# Patient Record
Sex: MALE | Race: BLACK OR AFRICAN AMERICAN | Employment: OTHER | URBAN - METROPOLITAN AREA
[De-identification: names, ages, dates, MRNs, and addresses within clinical notes are randomized per-mention and may not be internally consistent; named-entity substitution may affect disease eponyms.]

---

## 2018-05-24 ENCOUNTER — HOSPITAL ENCOUNTER (OUTPATIENT)
Dept: LAB | Age: 78
Discharge: HOME | End: 2018-05-24
Payer: MEDICARE

## 2018-05-24 DIAGNOSIS — R80.9 PROTEINURIA: ICD-10-CM

## 2018-05-24 DIAGNOSIS — E11.22 TYPE 2 DIABETES MELLITUS WITH ESRD (END-STAGE RENAL DISEASE) (HCC): ICD-10-CM

## 2018-05-24 DIAGNOSIS — N18.6 TYPE 2 DIABETES MELLITUS WITH ESRD (END-STAGE RENAL DISEASE) (HCC): ICD-10-CM

## 2018-05-24 LAB
25(OH)D3 SERPL-MCNC: 21.8 NG/ML (ref 30–100)
ALBUMIN SERPL-MCNC: 3.9 G/DL (ref 3.4–5)
ANION GAP SERPL CALC-SCNC: 8 MMOL/L (ref 3–18)
BASOPHILS # BLD: 0 K/UL (ref 0–0.06)
BASOPHILS NFR BLD: 0 % (ref 0–2)
BUN SERPL-MCNC: 8 MG/DL (ref 7–18)
BUN/CREAT SERPL: 11 (ref 12–20)
CALCIUM SERPL-MCNC: 10.1 MG/DL (ref 8.5–10.1)
CHLORIDE SERPL-SCNC: 89 MMOL/L (ref 100–108)
CO2 SERPL-SCNC: 32 MMOL/L (ref 21–32)
CREAT SERPL-MCNC: 0.71 MG/DL (ref 0.6–1.3)
CREAT UR-MCNC: 14.2 MG/DL (ref 30–125)
CREAT UR-MCNC: 15 MG/DL (ref 30–125)
DIFFERENTIAL METHOD BLD: ABNORMAL
EOSINOPHIL # BLD: 0.4 K/UL (ref 0–0.4)
EOSINOPHIL NFR BLD: 7 % (ref 0–5)
ERYTHROCYTE [DISTWIDTH] IN BLOOD BY AUTOMATED COUNT: 15 % (ref 11.6–14.5)
EST. AVERAGE GLUCOSE BLD GHB EST-MCNC: 134 MG/DL
GLUCOSE SERPL-MCNC: 82 MG/DL (ref 74–99)
HBA1C MFR BLD: 6.3 % (ref 4.2–5.6)
HCT VFR BLD AUTO: 40.1 % (ref 36–48)
HGB BLD-MCNC: 14.8 G/DL (ref 13–16)
LYMPHOCYTES # BLD: 1 K/UL (ref 0.9–3.6)
LYMPHOCYTES NFR BLD: 17 % (ref 21–52)
MCH RBC QN AUTO: 28.2 PG (ref 24–34)
MCHC RBC AUTO-ENTMCNC: 36.9 G/DL (ref 31–37)
MCV RBC AUTO: 76.4 FL (ref 74–97)
MICROALBUMIN UR-MCNC: 45.9 MG/DL (ref 0–3)
MICROALBUMIN/CREAT UR-RTO: 3232 MG/G (ref 0–30)
MONOCYTES # BLD: 0.8 K/UL (ref 0.05–1.2)
MONOCYTES NFR BLD: 15 % (ref 3–10)
NEUTS SEG # BLD: 3.5 K/UL (ref 1.8–8)
NEUTS SEG NFR BLD: 61 % (ref 40–73)
PHOSPHATE SERPL-MCNC: 3.7 MG/DL (ref 2.5–4.9)
PLATELET # BLD AUTO: 324 K/UL (ref 135–420)
PMV BLD AUTO: 9.6 FL (ref 9.2–11.8)
POTASSIUM SERPL-SCNC: 3.8 MMOL/L (ref 3.5–5.5)
PROT UR-MCNC: 50 MG/DL
RBC # BLD AUTO: 5.25 M/UL (ref 4.7–5.5)
SODIUM SERPL-SCNC: 129 MMOL/L (ref 136–145)
WBC # BLD AUTO: 5.7 K/UL (ref 4.6–13.2)

## 2018-05-24 PROCEDURE — 36415 COLL VENOUS BLD VENIPUNCTURE: CPT | Performed by: INTERNAL MEDICINE

## 2018-05-24 PROCEDURE — 82306 VITAMIN D 25 HYDROXY: CPT | Performed by: INTERNAL MEDICINE

## 2018-05-24 PROCEDURE — 85025 COMPLETE CBC W/AUTO DIFF WBC: CPT | Performed by: INTERNAL MEDICINE

## 2018-05-24 PROCEDURE — 84156 ASSAY OF PROTEIN URINE: CPT | Performed by: INTERNAL MEDICINE

## 2018-05-24 PROCEDURE — 83036 HEMOGLOBIN GLYCOSYLATED A1C: CPT | Performed by: INTERNAL MEDICINE

## 2018-05-24 PROCEDURE — 80069 RENAL FUNCTION PANEL: CPT | Performed by: INTERNAL MEDICINE

## 2018-05-24 PROCEDURE — 82570 ASSAY OF URINE CREATININE: CPT | Performed by: INTERNAL MEDICINE

## 2018-05-24 PROCEDURE — 83883 ASSAY NEPHELOMETRY NOT SPEC: CPT | Performed by: INTERNAL MEDICINE

## 2018-05-24 PROCEDURE — 82784 ASSAY IGA/IGD/IGG/IGM EACH: CPT | Performed by: INTERNAL MEDICINE

## 2018-05-25 LAB
ALBUMIN 24H MFR UR ELPH: 78.3 %
ALPHA1 GLOB 24H MFR UR ELPH: 5.8 %
ALPHA2 GLOB 24H MFR UR ELPH: 3.7 %
B-GLOBULIN MFR UR ELPH: 7.7 %
GAMMA GLOB 24H MFR UR ELPH: 4.5 %
INTERPRETATION UR IFE-IMP: NORMAL
KAPPA LC FREE SER-MCNC: 13.4 MG/L (ref 3.3–19.4)
KAPPA LC FREE/LAMBDA FREE SER: 1.16 {RATIO} (ref 0.26–1.65)
LAMBDA LC FREE SERPL-MCNC: 11.6 MG/L (ref 5.7–26.3)
M PROTEIN 24H MFR UR ELPH: NORMAL %
NOTE, 149533: NORMAL
PROT UR-MCNC: 39.6 MG/DL

## 2018-05-28 LAB
IGA SERPL-MCNC: 108 MG/DL (ref 61–437)
IGG SERPL-MCNC: 673 MG/DL (ref 700–1600)
IGM SERPL-MCNC: 32 MG/DL (ref 15–143)
PROT PATTERN SERPL IFE-IMP: ABNORMAL

## 2018-07-24 ENCOUNTER — HOSPITAL ENCOUNTER (OUTPATIENT)
Dept: LAB | Age: 78
Discharge: HOME | End: 2018-07-24
Payer: MEDICARE

## 2018-07-24 DIAGNOSIS — E11.22 TYPE 2 DIABETES MELLITUS WITH ESRD (END-STAGE RENAL DISEASE) (HCC): ICD-10-CM

## 2018-07-24 DIAGNOSIS — N18.6 TYPE 2 DIABETES MELLITUS WITH ESRD (END-STAGE RENAL DISEASE) (HCC): ICD-10-CM

## 2018-07-24 DIAGNOSIS — R80.9 PROTEINURIA: ICD-10-CM

## 2018-07-24 LAB
25(OH)D3 SERPL-MCNC: 20.4 NG/ML (ref 30–100)
ALBUMIN SERPL-MCNC: 4 G/DL (ref 3.4–5)
ANION GAP SERPL CALC-SCNC: 8 MMOL/L (ref 3–18)
APPEARANCE UR: CLEAR
BILIRUB UR QL: NEGATIVE
BUN SERPL-MCNC: 9 MG/DL (ref 7–18)
BUN/CREAT SERPL: 11 (ref 12–20)
CALCIUM SERPL-MCNC: 10.3 MG/DL (ref 8.5–10.1)
CALCIUM SERPL-MCNC: 10.6 MG/DL (ref 8.5–10.1)
CHLORIDE SERPL-SCNC: 90 MMOL/L (ref 100–108)
CO2 SERPL-SCNC: 31 MMOL/L (ref 21–32)
COLOR UR: YELLOW
CREAT SERPL-MCNC: 0.81 MG/DL (ref 0.6–1.3)
CREAT UR-MCNC: <13 MG/DL (ref 30–125)
GLUCOSE SERPL-MCNC: 85 MG/DL (ref 74–99)
GLUCOSE UR STRIP.AUTO-MCNC: NEGATIVE MG/DL
HGB UR QL STRIP: NEGATIVE
KETONES UR QL STRIP.AUTO: NEGATIVE MG/DL
LEUKOCYTE ESTERASE UR QL STRIP.AUTO: NEGATIVE
MICROALBUMIN UR-MCNC: 19.7 MG/DL (ref 0–3)
MICROALBUMIN/CREAT UR-RTO: ABNORMAL MG/G (ref 0–30)
NITRITE UR QL STRIP.AUTO: NEGATIVE
PH UR STRIP: 6.5 [PH] (ref 5–8)
PHOSPHATE SERPL-MCNC: 3.7 MG/DL (ref 2.5–4.9)
POTASSIUM SERPL-SCNC: 4.1 MMOL/L (ref 3.5–5.5)
PROT UR STRIP-MCNC: ABNORMAL MG/DL
PROT UR-MCNC: 22 MG/DL
PTH-INTACT SERPL-MCNC: 74.2 PG/ML (ref 18.4–88)
SODIUM SERPL-SCNC: 129 MMOL/L (ref 136–145)
SP GR UR REFRACTOMETRY: 1.01 (ref 1–1.03)
UROBILINOGEN UR QL STRIP.AUTO: 0.2 EU/DL (ref 0.2–1)

## 2018-07-24 PROCEDURE — 82306 VITAMIN D 25 HYDROXY: CPT | Performed by: INTERNAL MEDICINE

## 2018-07-24 PROCEDURE — 83970 ASSAY OF PARATHORMONE: CPT | Performed by: INTERNAL MEDICINE

## 2018-07-24 PROCEDURE — 80069 RENAL FUNCTION PANEL: CPT | Performed by: INTERNAL MEDICINE

## 2018-07-24 PROCEDURE — 82570 ASSAY OF URINE CREATININE: CPT | Performed by: INTERNAL MEDICINE

## 2018-07-24 PROCEDURE — 81001 URINALYSIS AUTO W/SCOPE: CPT | Performed by: INTERNAL MEDICINE

## 2018-07-24 PROCEDURE — 84156 ASSAY OF PROTEIN URINE: CPT | Performed by: INTERNAL MEDICINE

## 2018-07-24 PROCEDURE — 36415 COLL VENOUS BLD VENIPUNCTURE: CPT | Performed by: INTERNAL MEDICINE

## 2018-12-26 ENCOUNTER — APPOINTMENT (OUTPATIENT)
Dept: GENERAL RADIOLOGY | Age: 78
End: 2018-12-26
Attending: PHYSICIAN ASSISTANT
Payer: MEDICARE

## 2018-12-26 ENCOUNTER — HOSPITAL ENCOUNTER (EMERGENCY)
Age: 78
Discharge: HOME | End: 2018-12-26
Attending: EMERGENCY MEDICINE
Payer: MEDICARE

## 2018-12-26 VITALS
DIASTOLIC BLOOD PRESSURE: 75 MMHG | TEMPERATURE: 99.7 F | BODY MASS INDEX: 29.33 KG/M2 | HEART RATE: 94 BPM | OXYGEN SATURATION: 100 % | WEIGHT: 198 LBS | SYSTOLIC BLOOD PRESSURE: 159 MMHG | RESPIRATION RATE: 18 BRPM | HEIGHT: 69 IN

## 2018-12-26 DIAGNOSIS — E87.1 HYPONATREMIA: ICD-10-CM

## 2018-12-26 DIAGNOSIS — E87.6 HYPOKALEMIA: ICD-10-CM

## 2018-12-26 DIAGNOSIS — J44.1 ACUTE EXACERBATION OF CHRONIC OBSTRUCTIVE PULMONARY DISEASE (COPD) (HCC): Primary | ICD-10-CM

## 2018-12-26 LAB
ANION GAP SERPL CALC-SCNC: 11 MMOL/L (ref 3–18)
ATRIAL RATE: 89 BPM
BASOPHILS # BLD: 0 K/UL (ref 0–0.1)
BASOPHILS NFR BLD: 0 % (ref 0–2)
BUN SERPL-MCNC: 9 MG/DL (ref 7–18)
BUN/CREAT SERPL: 9 (ref 12–20)
CALCIUM SERPL-MCNC: 11 MG/DL (ref 8.5–10.1)
CALCULATED P AXIS, ECG09: 46 DEGREES
CALCULATED R AXIS, ECG10: 14 DEGREES
CALCULATED T AXIS, ECG11: 45 DEGREES
CHLORIDE SERPL-SCNC: 89 MMOL/L (ref 100–108)
CO2 SERPL-SCNC: 29 MMOL/L (ref 21–32)
CREAT SERPL-MCNC: 1 MG/DL (ref 0.6–1.3)
DIAGNOSIS, 93000: NORMAL
DIFFERENTIAL METHOD BLD: ABNORMAL
EOSINOPHIL # BLD: 1 K/UL (ref 0–0.4)
EOSINOPHIL NFR BLD: 11 % (ref 0–5)
ERYTHROCYTE [DISTWIDTH] IN BLOOD BY AUTOMATED COUNT: 14.8 % (ref 11.6–14.5)
GLUCOSE SERPL-MCNC: 101 MG/DL (ref 74–99)
HCT VFR BLD AUTO: 40.3 % (ref 36–48)
HGB BLD-MCNC: 14.8 G/DL (ref 13–16)
LYMPHOCYTES # BLD: 1 K/UL (ref 0.9–3.6)
LYMPHOCYTES NFR BLD: 11 % (ref 21–52)
MCH RBC QN AUTO: 27.8 PG (ref 24–34)
MCHC RBC AUTO-ENTMCNC: 36.7 G/DL (ref 31–37)
MCV RBC AUTO: 75.8 FL (ref 74–97)
MONOCYTES # BLD: 0.9 K/UL (ref 0.05–1.2)
MONOCYTES NFR BLD: 10 % (ref 3–10)
NEUTS SEG # BLD: 6.3 K/UL (ref 1.8–8)
NEUTS SEG NFR BLD: 68 % (ref 40–73)
P-R INTERVAL, ECG05: 194 MS
PLATELET # BLD AUTO: 325 K/UL (ref 135–420)
PLATELET COMMENTS,PCOM: ABNORMAL
PMV BLD AUTO: 9.3 FL (ref 9.2–11.8)
POTASSIUM SERPL-SCNC: 3.3 MMOL/L (ref 3.5–5.5)
Q-T INTERVAL, ECG07: 366 MS
QRS DURATION, ECG06: 124 MS
QTC CALCULATION (BEZET), ECG08: 445 MS
RBC # BLD AUTO: 5.32 M/UL (ref 4.7–5.5)
RBC MORPH BLD: ABNORMAL
RBC MORPH BLD: ABNORMAL
SODIUM SERPL-SCNC: 129 MMOL/L (ref 136–145)
TROPONIN I SERPL-MCNC: <0.02 NG/ML (ref 0–0.06)
VENTRICULAR RATE, ECG03: 89 BPM
WBC # BLD AUTO: 9.2 K/UL (ref 4.6–13.2)

## 2018-12-26 PROCEDURE — 85025 COMPLETE CBC W/AUTO DIFF WBC: CPT

## 2018-12-26 PROCEDURE — 77030029684 HC NEB SM VOL KT MONA -A

## 2018-12-26 PROCEDURE — 93005 ELECTROCARDIOGRAM TRACING: CPT

## 2018-12-26 PROCEDURE — 74011000250 HC RX REV CODE- 250: Performed by: PHYSICIAN ASSISTANT

## 2018-12-26 PROCEDURE — 96374 THER/PROPH/DIAG INJ IV PUSH: CPT

## 2018-12-26 PROCEDURE — 80048 BASIC METABOLIC PNL TOTAL CA: CPT

## 2018-12-26 PROCEDURE — 84484 ASSAY OF TROPONIN QUANT: CPT

## 2018-12-26 PROCEDURE — 74011250636 HC RX REV CODE- 250/636: Performed by: PHYSICIAN ASSISTANT

## 2018-12-26 PROCEDURE — 71046 X-RAY EXAM CHEST 2 VIEWS: CPT

## 2018-12-26 PROCEDURE — 94640 AIRWAY INHALATION TREATMENT: CPT

## 2018-12-26 PROCEDURE — 74011000250 HC RX REV CODE- 250: Performed by: EMERGENCY MEDICINE

## 2018-12-26 PROCEDURE — 99283 EMERGENCY DEPT VISIT LOW MDM: CPT

## 2018-12-26 RX ORDER — IPRATROPIUM BROMIDE AND ALBUTEROL SULFATE 2.5; .5 MG/3ML; MG/3ML
3 SOLUTION RESPIRATORY (INHALATION)
Status: COMPLETED | OUTPATIENT
Start: 2018-12-26 | End: 2018-12-26

## 2018-12-26 RX ORDER — BENZONATATE 100 MG/1
100 CAPSULE ORAL
Qty: 21 CAP | Refills: 0 | Status: SHIPPED | OUTPATIENT
Start: 2018-12-26 | End: 2019-01-02

## 2018-12-26 RX ORDER — PREDNISONE 50 MG/1
50 TABLET ORAL DAILY
Qty: 5 TAB | Refills: 0 | Status: SHIPPED | OUTPATIENT
Start: 2018-12-26 | End: 2018-12-31

## 2018-12-26 RX ORDER — POTASSIUM CHLORIDE 1500 MG/1
20 TABLET, FILM COATED, EXTENDED RELEASE ORAL DAILY
Qty: 20 TAB | Refills: 0 | Status: SHIPPED | OUTPATIENT
Start: 2018-12-26

## 2018-12-26 RX ORDER — LEVOFLOXACIN 750 MG/1
750 TABLET ORAL DAILY
Qty: 5 TAB | Refills: 0 | Status: SHIPPED | OUTPATIENT
Start: 2018-12-26 | End: 2018-12-31

## 2018-12-26 RX ADMIN — IPRATROPIUM BROMIDE AND ALBUTEROL SULFATE 3 ML: .5; 3 SOLUTION RESPIRATORY (INHALATION) at 16:21

## 2018-12-26 RX ADMIN — IPRATROPIUM BROMIDE AND ALBUTEROL SULFATE 3 ML: .5; 3 SOLUTION RESPIRATORY (INHALATION) at 16:01

## 2018-12-26 RX ADMIN — METHYLPREDNISOLONE SODIUM SUCCINATE 125 MG: 125 INJECTION, POWDER, FOR SOLUTION INTRAMUSCULAR; INTRAVENOUS at 17:39

## 2018-12-26 NOTE — ED TRIAGE NOTES
Patient c/o SOB that started last night. He has history of COPD. Patient denies chest pain.   Patient states he has been sick with a cold and has had non productive cough

## 2018-12-26 NOTE — DISCHARGE INSTRUCTIONS
Hyponatremia: Care Instructions  Your Care Instructions    Hyponatremia (say \"qm-ez-hdc-TREE-pushpa-uh\") means that you don't have enough sodium in your blood. It can cause nausea, vomiting, and headaches. Or you may not feel hungry. In serious cases, it can cause seizures, a coma, or even death. Hyponatremia is not a disease. It is a problem caused by something else, such as medicines or exercising for a long time in hot weather. You can get hyponatremia if you lose a lot of fluids and then you drink a lot of water or other liquids that don't have much sodium. You can also get it if you have kidney, liver, heart, or other health problems. Treatment is focused on getting your sodium levels back to normal.  Follow-up care is a key part of your treatment and safety. Be sure to make and go to all appointments, and call your doctor if you are having problems. It's also a good idea to know your test results and keep a list of the medicines you take. How can you care for yourself at home? · If your doctor recommends it, drink fluids that have sodium. Sports drinks are a good choice. Or you can eat salty foods. · If your doctor recommends it, limit the amount of water you drink. And limit fluids that are mostly water. These include tea, coffee, and juice. · Take your medicines exactly as prescribed. Call your doctor if you have any problems with your medicine. · Get your sodium levels tested when your doctor tells you to. When should you call for help? Call 911 anytime you think you may need emergency care.  For example, call if:    · You have a seizure.     · You passed out (lost consciousness).    Call your doctor now or seek immediate medical care if:    · You are confused or it is hard to focus.     · You have little or no appetite.     · You feel sick to your stomach or you vomit.     · You have a headache.     · You have mood changes.     · You feel more tired than usual.    Watch closely for changes in your health, and be sure to contact your doctor if:    · You do not get better as expected. Where can you learn more? Go to http://lowell-simin.info/. Enter U127 in the search box to learn more about \"Hyponatremia: Care Instructions. \"  Current as of: June 26, 2018  Content Version: 11.8  © 5880-8500 [employer]. Care instructions adapted under license by [employer] (which disclaims liability or warranty for this information). If you have questions about a medical condition or this instruction, always ask your healthcare professional. [address] any warranty or liability for your use of this information. Hypokalemia: Care Instructions  Your Care Instructions    Hypokalemia (say \"rv-ch-qje-ELIEZER-pushpa-uh\") is a low level of potassium. The heart, muscles, kidneys, and nervous system all need potassium to work well. This problem has many different causes. Kidney problems, diet, and medicines like diuretics and laxatives can cause it. So can vomiting or diarrhea. In some cases, cancer is the cause. Your doctor may do tests to find the cause of your low potassium levels. You may need medicines to bring your potassium levels back to normal. You may also need regular blood tests to check your potassium. If you have very low potassium, you may need intravenous (IV) medicines. You also may need tests to check the electrical activity of your heart. Heart problems caused by low potassium levels can be very serious. Follow-up care is a key part of your treatment and safety. Be sure to make and go to all appointments, and call your doctor if you are having problems. It's also a good idea to know your test results and keep a list of the medicines you take. How can you care for yourself at home? · If your doctor recommends it, eat foods that have a lot of potassium. These include fresh fruits, juices, and vegetables.  They also include nuts, beans, and milk. · Be safe with medicines. If your doctor prescribes medicines or potassium supplements, take them exactly as directed. Call your doctor if you have any problems with your medicines. · Get your potassium levels tested as often as your doctor tells you. When should you call for help? Call 911 anytime you think you may need emergency care. For example, call if:    · You feel like your heart is missing beats. Heart problems caused by low potassium can cause death.     · You passed out (lost consciousness).     · You have a seizure.    Call your doctor now or seek immediate medical care if:    · You feel weak or unusually tired.     · You have severe arm or leg cramps.     · You have tingling or numbness.     · You feel sick to your stomach, or you vomit.     · You have belly cramps.     · You feel bloated or constipated.     · You have to urinate a lot.     · You feel very thirsty most of the time.     · You are dizzy or lightheaded, or you feel like you may faint.     · You feel depressed, or you lose touch with reality.    Watch closely for changes in your health, and be sure to contact your doctor if:    · You do not get better as expected. Where can you learn more? Go to http://lowell-simin.info/. Enter G358 in the search box to learn more about \"Hypokalemia: Care Instructions. \"  Current as of: March 15, 2018  Content Version: 11.8  © 1255-8357 [employer]. Care instructions adapted under license by [employer] (which disclaims liability or warranty for this information). If you have questions about a medical condition or this instruction, always ask your healthcare professional. [place] [address] any warranty or liability for your use of this information.          Chronic Obstructive Pulmonary Disease (COPD): Care Instructions  Your Care Instructions    Chronic obstructive pulmonary disease (COPD) is a general term for a group of lung diseases, including emphysema and chronic bronchitis. People with COPD have decreased airflow in and out of the lungs, which makes it hard to breathe. The airways also can get clogged with thick mucus. Cigarette smoking is a major cause of COPD. Although there is no cure for COPD, you can slow its progress. Following your treatment plan and taking care of yourself can help you feel better and live longer. Follow-up care is a key part of your treatment and safety. Be sure to make and go to all appointments, and call your doctor if you are having problems. It's also a good idea to know your test results and keep a list of the medicines you take. How can you care for yourself at home?   Staying healthy    · Do not smoke. This is the most important step you can take to prevent more damage to your lungs. If you need help quitting, talk to your doctor about stop-smoking programs and medicines. These can increase your chances of quitting for good.     · Avoid colds and flu. Get a pneumococcal vaccine shot. If you have had one before, ask your doctor whether you need a second dose. Get the flu vaccine every fall. If you must be around people with colds or the flu, wash your hands often.     · Avoid secondhand smoke, air pollution, and high altitudes. Also avoid cold, dry air and hot, humid air. Stay at home with your windows closed when air pollution is bad.    Medicines and oxygen therapy    · Take your medicines exactly as prescribed. Call your doctor if you think you are having a problem with your medicine.     · You may be taking medicines such as:  ? Bronchodilators. These help open your airways and make breathing easier. Bronchodilators are either short-acting (work for 6 to 9 hours) or long-acting (work for 24 hours). You inhale most bronchodilators, so they start to act quickly. Always carry your quick-relief inhaler with you in case you need it while you are away from home. ?  Corticosteroids (prednisone, budesonide). These reduce airway inflammation. They come in pill or inhaled form. You must take these medicines every day for them to work well.     · A spacer may help you get more inhaled medicine to your lungs. Ask your doctor or pharmacist if a spacer is right for you. If it is, ask how to use it properly.     · Do not take any vitamins, over-the-counter medicine, or herbal products without talking to your doctor first.     · If your doctor prescribed antibiotics, take them as directed. Do not stop taking them just because you feel better. You need to take the full course of antibiotics.     · Oxygen therapy boosts the amount of oxygen in your blood and helps you breathe easier. Use the flow rate your doctor has recommended, and do not change it without talking to your doctor first.   Activity    · Get regular exercise. Walking is an easy way to get exercise. Start out slowly, and walk a little more each day.     · Pay attention to your breathing. You are exercising too hard if you cannot talk while you are exercising.     · Take short rest breaks when doing household chores and other activities.     · Learn breathing methods--such as breathing through pursed lips--to help you become less short of breath.     · If your doctor has not set you up with a pulmonary rehabilitation program, talk to him or her about whether rehab is right for you. Rehab includes exercise programs, education about your disease and how to manage it, help with diet and other changes, and emotional support. Diet    · Eat regular, healthy meals. Use bronchodilators about 1 hour before you eat to make it easier to eat. Eat several small meals instead of three large ones.  Drink beverages at the end of the meal. Avoid foods that are hard to chew.     · Eat foods that contain protein so that you do not lose muscle mass.     · Talk with your doctor if you gain too much weight or if you lose weight without trying.    Mental health    · Talk to your family, friends, or a therapist about your feelings. It is normal to feel frightened, angry, hopeless, helpless, and even guilty. Talking openly about bad feelings can help you cope. If these feelings last, talk to your doctor. When should you call for help? Call 911 anytime you think you may need emergency care. For example, call if:    · You have severe trouble breathing.    Call your doctor now or seek immediate medical care if:    · You have new or worse trouble breathing.     · You cough up blood.     · You have a fever.    Watch closely for changes in your health, and be sure to contact your doctor if:    · You cough more deeply or more often, especially if you notice more mucus or a change in the color of your mucus.     · You have new or worse swelling in your legs or belly.     · You are not getting better as expected. Where can you learn more? Go to http://lowell-simin.info/. Christine Grider in the search box to learn more about \"Chronic Obstructive Pulmonary Disease (COPD): Care Instructions. \"  Current as of: December 6, 2017  Content Version: 11.8  © 2371-7582 [employer]. Care instructions adapted under license by [employer] (which disclaims liability or warranty for this information). If you have questions about a medical condition or this instruction, always ask your healthcare professional. [address] any warranty or liability for your use of this information.

## 2018-12-26 NOTE — ED NOTES
Alicia Rothman is a 69 y.o. male that was discharged in stable condition. The patients diagnosis, condition and treatment were explained to  patient and aftercare instructions were given. The patient verbalized understanding. Patient armband removed and shredded.

## 2018-12-26 NOTE — ED PROVIDER NOTES
The history is provided by the patient. Shortness of Breath   This is a recurrent problem. The problem occurs continuously. The current episode started yesterday. The problem has been gradually worsening. Associated symptoms include cough (x 3 weeks), sputum production, hemoptysis and wheezing. Pertinent negatives include no fever, no headaches, no coryza, no rhinorrhea, no sore throat, no swollen glands, no ear pain, no neck pain, no chest pain, no syncope, no vomiting, no abdominal pain, no rash, no leg pain and no leg swelling. It is unknown what precipitated the problem. He has tried beta-agonist inhalers for the symptoms. The treatment provided no relief. He has had prior ED visits. Associated medical issues include COPD. Past Medical History:   Diagnosis Date    Anxiety     Asthma     no problems    Diabetes ([facility] [address]     diet controlled/exercise    Hypercholesteremia     Hypertension        Past Surgical History:   Procedure Laterality Date    HX CATARACT REMOVAL      bilateral    HX COLONOSCOPY           History reviewed. No pertinent family history.     Social History     Socioeconomic History    Marital status:      Spouse name: Not on file    Number of children: Not on file    Years of education: Not on file    Highest education level: Not on file   Social Needs    Financial resource strain: Not on file    Food insecurity - worry: Not on file    Food insecurity - inability: Not on file    Transportation needs - medical: Not on file   Mobile Captain needs - non-medical: Not on file   Occupational History    Not on file   Tobacco Use    Smoking status: Former Smoker    Smokeless tobacco: Never Used    Tobacco comment: quit smoking in 2002   Substance and Sexual Activity    Alcohol use: Yes     Comment: 2 beers per day    Drug use: No    Sexual activity: Not on file   Other Topics Concern    Not on file   Social History Narrative    Not on file         ALLERGIES: Pcn [penicillins] and Vioxx [rofecoxib]    Review of Systems   Constitutional: Negative for chills and fever. HENT: Negative for ear pain, rhinorrhea and sore throat. Eyes: Negative for pain and redness. Respiratory: Positive for cough (x 3 weeks), hemoptysis, sputum production, shortness of breath and wheezing. Negative for stridor. Cardiovascular: Negative for chest pain, palpitations, leg swelling and syncope. Gastrointestinal: Negative for abdominal pain, constipation, diarrhea, nausea and vomiting. Genitourinary: Negative for dysuria. Musculoskeletal: Negative for back pain and neck pain. Skin: Negative. Negative for rash. Neurological: Negative for dizziness, light-headedness and headaches. Psychiatric/Behavioral: Negative. All other systems reviewed and are negative. Vitals:    12/26/18 1600   BP: 159/75   Pulse: 94   Resp: 18   Temp: 99.7 °F (37.6 °C)   SpO2: 100%   Weight: 89.8 kg (198 lb)   Height: 5' 9\" (1.753 m)            Physical Exam   Constitutional: He is oriented to person, place, and time. He appears well-developed and well-nourished. Non-toxic appearance. He does not appear ill. No distress. HENT:   Head: Normocephalic and atraumatic. Right Ear: Tympanic membrane, external ear and ear canal normal.   Left Ear: Tympanic membrane, external ear and ear canal normal.   Nose: Nose normal.   Mouth/Throat: Oropharynx is clear and moist and mucous membranes are normal. No oropharyngeal exudate or posterior oropharyngeal edema. Eyes: Conjunctivae and EOM are normal. Pupils are equal, round, and reactive to light. Neck: Normal range of motion. Neck supple. Cardiovascular: Normal rate, regular rhythm and normal heart sounds. Pulmonary/Chest: Effort normal. No tachypnea. No respiratory distress. He has no decreased breath sounds. He has wheezes. He has no rhonchi. He has no rales. Abdominal: Soft. Bowel sounds are normal. There is no tenderness.    Musculoskeletal: Normal range of motion. Right lower leg: He exhibits no tenderness and no edema. Left lower leg: He exhibits no tenderness and no edema. Lymphadenopathy:     He has no cervical adenopathy. Neurological: He is alert and oriented to person, place, and time. Skin: Skin is warm and dry. Capillary refill takes less than 2 seconds. He is not diaphoretic. Psychiatric: He has a normal mood and affect. His behavior is normal. Judgment and thought content normal.   Nursing note and vitals reviewed. MDM  Number of Diagnoses or Management Options  Acute exacerbation of chronic obstructive pulmonary disease (COPD) ([facility] [address]): new and requires workup  Hypokalemia: new and requires workup  Hyponatremia: new and requires workup  Diagnosis management comments: DDx:  viral vs bacterial URI, asthma exacerbation, COPD, bronchitis, PNE, PE, allergies, pneumothorax, atypical CP, costochondritis/chest wall pain, HF, MI,  pleuritic chest pain, pleural effusion    Based upon the patient's presentation with noted HPI and PE, along with the work up done in the emergency department, I believe that the patient is having an acute COPD exacerbation. Also some mild asymtpomatic hyponatremia and hypokalemia, will have pt f/u with PCP for these. The patient's respiratory status improved in the emergency department with noted HHN treatments, steroids, and other noted modalities and medications. I believe that the patient has improved sufficiently enough for discharge home. DIAGNOSIS:    1. Acute COPD exacerbation. Plan/discharge instructions:  1. Return if any concerns or worsening of condition(s)  2. Levaquin and steroids as prescribed until finished. 3. Use the albuterol inhaler as prescribed for wheezing and/or cough. Tessalon as prescribed as needed for cough. 4. FOLLOW UP APPOINTMENT:  Your primary doctor in 1-2 days. Pt results have been reviewed with the patient and any family present.  They have been counseled regarding diagnosis, treatment, and plan. They verbally convey understanding and agreement of the signs, symptoms, diagnosis, treatment and prognosis and additionally agrees to follow up as discussed. They also agree with the care-plan and convey that all of their questions have been answered. I have also provided discharge instructions for them that include: educational information regarding their diagnosis and treatment, and list of reasons why they would want to return to the ED prior to their follow-up appointment, should their condition change. July Harrison PA-C         Amount and/or Complexity of Data Reviewed  Clinical lab tests: ordered and reviewed  Tests in the radiology section of CPT®: ordered and reviewed  Review and summarize past medical records: yes  Discuss the patient with other providers: yes  Independent visualization of images, tracings, or specimens: yes    Risk of Complications, Morbidity, and/or Mortality  Presenting problems: moderate  Diagnostic procedures: moderate  Management options: moderate    Patient Progress  Patient progress: stable         Procedures    Labs Reviewed   CBC WITH AUTOMATED DIFF - Abnormal; Notable for the following components:       Result Value    RDW 14.8 (*)     All other components within normal limits   METABOLIC PANEL, BASIC - Abnormal; Notable for the following components:    Sodium 129 (*)     Potassium 3.3 (*)     Chloride 89 (*)     Glucose 101 (*)     BUN/Creatinine ratio 9 (*)     Calcium 11.0 (*)     All other components within normal limits   TROPONIN I     Diagnosis:   1. Acute exacerbation of chronic obstructive pulmonary disease (COPD) ([facility] [address]    2. Hypokalemia    3. Hyponatremia          Disposition: Discharge to home.      Follow-up Information     Follow up With Specialties Details [facility] [place] [facility] EMERGENCY DEPT Emergency Medicine  As needed, If symptoms worsen [address] [facility]  [phone] [address] [facility]  Go in 2 days  [facility] [address] [place] [address] [address] [facility]             Medication List      START taking these medications    benzonatate 100 mg capsule  Commonly known as:  TESSALON PERLES  Take 1 Cap by mouth three (3) times daily as needed for Cough for up to 7 days. levoFLOXacin 750 mg tablet  Commonly known as:  LEVAQUIN  Take 1 Tab by mouth daily for 5 days. potassium chloride SR 20 mEq tablet  Commonly known as:  K-TAB  Take 1 Tab by mouth daily. predniSONE 50 mg tablet  Commonly known as:  DELTASONE  Take 1 Tab by mouth daily for 5 days. ASK your doctor about these medications    BROMDAY 0.09 % ophthalmic solution  Generic drug:  bromfenac     pravastatin 10 mg tablet  Commonly known as:  PRAVACHOL     raNITIdine hcl 300 mg Cap  Take 300 mg by mouth nightly.      TRAVATAN Z 0.004 % ophthalmic solution  Generic drug:  travoprost     trihexyphenidyl 5 mg tablet  Commonly known as:  ARTANE     valsartan 160 mg tablet  Commonly known as:  DIOVAN           Where to Get Your Medications      Information about where to get these medications is not yet available    Ask your nurse or doctor about these medications  · benzonatate 100 mg capsule  · levoFLOXacin 750 mg tablet  · potassium chloride SR 20 mEq tablet  · predniSONE 50 mg tablet

## 2019-01-16 ENCOUNTER — APPOINTMENT (OUTPATIENT)
Dept: GENERAL RADIOLOGY | Age: 79
End: 2019-01-16
Attending: EMERGENCY MEDICINE
Payer: MEDICARE

## 2019-01-16 ENCOUNTER — HOSPITAL ENCOUNTER (EMERGENCY)
Age: 79
Discharge: HOME | End: 2019-01-16
Attending: EMERGENCY MEDICINE
Payer: MEDICARE

## 2019-01-16 VITALS
WEIGHT: 194 LBS | OXYGEN SATURATION: 93 % | RESPIRATION RATE: 22 BRPM | BODY MASS INDEX: 28.73 KG/M2 | TEMPERATURE: 98.8 F | HEIGHT: 69 IN | SYSTOLIC BLOOD PRESSURE: 133 MMHG | HEART RATE: 90 BPM | DIASTOLIC BLOOD PRESSURE: 75 MMHG

## 2019-01-16 DIAGNOSIS — J44.1 COPD WITH ACUTE EXACERBATION (HCC): Primary | ICD-10-CM

## 2019-01-16 LAB
ALBUMIN SERPL-MCNC: 4.1 G/DL (ref 3.4–5)
ALBUMIN/GLOB SERPL: 1.1 {RATIO} (ref 0.8–1.7)
ALP SERPL-CCNC: 79 U/L (ref 45–117)
ALT SERPL-CCNC: 23 U/L (ref 16–61)
ANION GAP SERPL CALC-SCNC: 8 MMOL/L (ref 3–18)
AST SERPL-CCNC: 24 U/L (ref 15–37)
BASOPHILS # BLD: 0 K/UL (ref 0–0.06)
BASOPHILS NFR BLD: 0 % (ref 0–3)
BILIRUB SERPL-MCNC: 0.6 MG/DL (ref 0.2–1)
BNP SERPL-MCNC: 91 PG/ML (ref 0–1800)
BUN SERPL-MCNC: 8 MG/DL (ref 7–18)
BUN/CREAT SERPL: 9 (ref 12–20)
CALCIUM SERPL-MCNC: 10.1 MG/DL (ref 8.5–10.1)
CHLORIDE SERPL-SCNC: 92 MMOL/L (ref 100–108)
CO2 SERPL-SCNC: 30 MMOL/L (ref 21–32)
CREAT SERPL-MCNC: 0.93 MG/DL (ref 0.6–1.3)
DIFFERENTIAL METHOD BLD: ABNORMAL
EOSINOPHIL # BLD: 0.9 K/UL (ref 0–0.4)
EOSINOPHIL NFR BLD: 14 % (ref 0–5)
ERYTHROCYTE [DISTWIDTH] IN BLOOD BY AUTOMATED COUNT: 14.2 % (ref 11.6–14.5)
GLOBULIN SER CALC-MCNC: 3.6 G/DL (ref 2–4)
GLUCOSE SERPL-MCNC: 124 MG/DL (ref 74–99)
HCT VFR BLD AUTO: 40.4 % (ref 36–48)
HGB BLD-MCNC: 15 G/DL (ref 13–16)
LYMPHOCYTES # BLD: 0.8 K/UL (ref 0.8–3.5)
LYMPHOCYTES NFR BLD: 13 % (ref 20–51)
MAGNESIUM SERPL-MCNC: 1.7 MG/DL (ref 1.6–2.6)
MCH RBC QN AUTO: 28.3 PG (ref 24–34)
MCHC RBC AUTO-ENTMCNC: 37.1 G/DL (ref 31–37)
MCV RBC AUTO: 76.2 FL (ref 74–97)
MONOCYTES # BLD: 0.6 K/UL (ref 0–1)
MONOCYTES NFR BLD: 10 % (ref 2–9)
NEUTS SEG # BLD: 3.9 K/UL (ref 1.8–8)
NEUTS SEG NFR BLD: 63 % (ref 42–75)
PLATELET # BLD AUTO: 341 K/UL (ref 135–420)
PLATELET COMMENTS,PCOM: ABNORMAL
PMV BLD AUTO: 9.2 FL (ref 9.2–11.8)
POTASSIUM SERPL-SCNC: 4.1 MMOL/L (ref 3.5–5.5)
PROT SERPL-MCNC: 7.7 G/DL (ref 6.4–8.2)
RBC # BLD AUTO: 5.3 M/UL (ref 4.7–5.5)
RBC MORPH BLD: ABNORMAL
SODIUM SERPL-SCNC: 130 MMOL/L (ref 136–145)
TROPONIN I SERPL-MCNC: <0.02 NG/ML (ref 0–0.06)
WBC # BLD AUTO: 6.2 K/UL (ref 4.6–13.2)

## 2019-01-16 PROCEDURE — 80053 COMPREHEN METABOLIC PANEL: CPT

## 2019-01-16 PROCEDURE — 99284 EMERGENCY DEPT VISIT MOD MDM: CPT

## 2019-01-16 PROCEDURE — 84484 ASSAY OF TROPONIN QUANT: CPT

## 2019-01-16 PROCEDURE — 74011250636 HC RX REV CODE- 250/636: Performed by: EMERGENCY MEDICINE

## 2019-01-16 PROCEDURE — 71045 X-RAY EXAM CHEST 1 VIEW: CPT

## 2019-01-16 PROCEDURE — 94640 AIRWAY INHALATION TREATMENT: CPT

## 2019-01-16 PROCEDURE — 93005 ELECTROCARDIOGRAM TRACING: CPT

## 2019-01-16 PROCEDURE — 74011000250 HC RX REV CODE- 250: Performed by: EMERGENCY MEDICINE

## 2019-01-16 PROCEDURE — 85025 COMPLETE CBC W/AUTO DIFF WBC: CPT

## 2019-01-16 PROCEDURE — 83735 ASSAY OF MAGNESIUM: CPT

## 2019-01-16 PROCEDURE — 83880 ASSAY OF NATRIURETIC PEPTIDE: CPT

## 2019-01-16 PROCEDURE — 96374 THER/PROPH/DIAG INJ IV PUSH: CPT

## 2019-01-16 PROCEDURE — 77030029684 HC NEB SM VOL KT MONA -A

## 2019-01-16 RX ORDER — IPRATROPIUM BROMIDE AND ALBUTEROL SULFATE 2.5; .5 MG/3ML; MG/3ML
3 SOLUTION RESPIRATORY (INHALATION)
Status: COMPLETED | OUTPATIENT
Start: 2019-01-16 | End: 2019-01-16

## 2019-01-16 RX ORDER — DOXYCYCLINE 100 MG/1
100 CAPSULE ORAL 2 TIMES DAILY
Qty: 14 CAP | Refills: 0 | Status: SHIPPED | OUTPATIENT
Start: 2019-01-16 | End: 2019-01-23

## 2019-01-16 RX ORDER — PREDNISONE 5 MG/1
TABLET ORAL
Qty: 48 TAB | Refills: 0 | Status: SHIPPED | OUTPATIENT
Start: 2019-01-16 | End: 2019-11-19

## 2019-01-16 RX ADMIN — IPRATROPIUM BROMIDE AND ALBUTEROL SULFATE 3 ML: .5; 3 SOLUTION RESPIRATORY (INHALATION) at 08:54

## 2019-01-16 RX ADMIN — METHYLPREDNISOLONE SODIUM SUCCINATE 125 MG: 125 INJECTION, POWDER, FOR SOLUTION INTRAMUSCULAR; INTRAVENOUS at 08:56

## 2019-01-16 RX ADMIN — IPRATROPIUM BROMIDE AND ALBUTEROL SULFATE 3 ML: .5; 3 SOLUTION RESPIRATORY (INHALATION) at 09:09

## 2019-01-16 RX ADMIN — IPRATROPIUM BROMIDE AND ALBUTEROL SULFATE 3 ML: .5; 3 SOLUTION RESPIRATORY (INHALATION) at 08:49

## 2019-01-16 NOTE — ED NOTES
Ambulated pt in ER with continuous pulse oximetry, hand held device, starting 02 sat at 92-93% room air, maintained for most of walk, dipped once quickly to 89%, returned to baseline, tolerated well, back to room, placed back on CM, 02 sat 91-93% room air. MD updated

## 2019-01-16 NOTE — ED TRIAGE NOTES
Pt co sob since  Yest. States has hx of COPD and  Has been off medication for COPD for about 4 years PMD took him off

## 2019-01-16 NOTE — ED NOTES
Patient armband removed and shreddedI have reviewed discharge instructions with the patient. The patient verbalized understanding. rx x 2 given; pt in nad

## 2019-01-16 NOTE — ED PROVIDER NOTES
EMERGENCY DEPARTMENT HISTORY AND PHYSICAL EXAM 
 
8:37 AM 
 
 
Date: 1/16/2019 Patient Name: Agnes Wood History of Presenting Illness Chief Complaint Patient presents with  Shortness of Breath History Provided By: Patient Chief Complaint: SOB Duration: 1 Days Timing:  Intermittent and Worsening Location: Respiratory Quality: \"Difficulty breathing\" Severity: Moderate Modifying Factors: worse with ambulation Associated Symptoms: productive cough Additional History (Context): Agnes Wood is a 69 y.o. male with PMHx of HTN, anxiety, asthma, diabetes who presents with intermittent moderate SOB that started x 1 day ago and continues to worsen. Pt has been having intermittent SOB for the past x 1 month with increasing frequent episodes. The SOB is worse with ambulation. Associated Sx include a productive cough. Pt is not followed by a Pulmonologist. Pt has not been on COPD medication for x 4 years. He does not wear oxygen at home. States he recently finished the Prednisone medication x 5 days ago. No tobacco use for x 17 years. Drinks ETOH daily. Denies leg swelling, change to weight, fever. Denies any further complaints or symptoms at the moment. PCP: Duncan, MD Ninfa 
 
Current Outpatient Medications Medication Sig Dispense Refill  predniSONE (STERAPRED) 5 mg dose pack As directed 48 Tab 0  
 doxycycline (MONODOX) 100 mg capsule Take 1 Cap by mouth two (2) times a day for 7 days. 14 Cap 0  
 potassium chloride SR (K-TAB) 20 mEq tablet Take 1 Tab by mouth daily. 20 Tab 0  
 ranitidine hcl 300 mg cap Take 300 mg by mouth nightly. 90 Cap 3  pravastatin (PRAVACHOL) 10 mg tablet Take 10 mg by mouth daily.  trihexyphenidyl (ARTANE) 5 mg tablet Take 10 mg by mouth daily. Indications: anxiety  valsartan (DIOVAN) 160 mg tablet Take 160 mg by mouth daily. Indications: HYPERTENSION  travoprost (TRAVATAN Z) 0.004 % ophthalmic solution Administer 1 Drop to both eyes every evening.  bromfenac (BROMDAY) 0.09 % ophthalmic solution Administer 1 Drop to both eyes two (2) times a day. Past History Past Medical History: 
Past Medical History:  
Diagnosis Date  Anxiety  Asthma   
 no problems  Diabetes ([address] diet controlled/exercise  Hypercholesteremia  Hypertension Past Surgical History: 
Past Surgical History:  
Procedure Laterality Date  HX CATARACT REMOVAL    
 bilateral  
 HX COLONOSCOPY Family History: No family history on file. Social History: 
Social History Tobacco Use  Smoking status: Former Smoker  Smokeless tobacco: Never Used  Tobacco comment: quit smoking in 2002 Substance Use Topics  Alcohol use: Yes Comment: 2 beers per day  Drug use: No  
 
 
Allergies: Allergies Allergen Reactions  Pcn [Penicillins] Unknown (comments)  Vioxx [Rofecoxib] Unknown (comments) Review of Systems Review of Systems Constitutional: Negative for activity change, fatigue, fever and unexpected weight change. HENT: Negative for congestion and rhinorrhea. Eyes: Negative for visual disturbance. Respiratory: Positive for cough and shortness of breath. Cardiovascular: Negative for chest pain, palpitations and leg swelling. Gastrointestinal: Negative for abdominal pain, diarrhea, nausea and vomiting. Genitourinary: Negative for dysuria and hematuria. Musculoskeletal: Negative for back pain. Skin: Negative for rash. Neurological: Negative for dizziness, weakness and light-headedness. All other systems reviewed and are negative. Physical Exam  
 
Visit Vitals /75 Pulse 90 Temp 98.8 °F (37.1 °C) Resp 22 Ht 5' 9\" (1.753 m) Wt 88 kg (194 lb) SpO2 93% BMI 28.65 kg/m² Physical Exam  
Constitutional: He is oriented to person, place, and time. He appears well-developed and well-nourished. No distress. Conversational dyspnea, mild retractions HENT:  
Head: Normocephalic and atraumatic. Right Ear: External ear normal.  
Left Ear: External ear normal.  
Mouth/Throat: Oropharynx is clear and moist.  
Boggy nasal mucosa Eyes: Conjunctivae and EOM are normal. Pupils are equal, round, and reactive to light. No scleral icterus. Neck: Normal range of motion. Neck supple. No JVD present. No tracheal deviation present. No thyromegaly present. Cardiovascular: Normal rate, regular rhythm, normal heart sounds and intact distal pulses. Exam reveals no gallop and no friction rub. No murmur heard. Pulmonary/Chest: He has wheezes. He exhibits no tenderness. Mild supraclavicular retractions Abdominal: Soft. Bowel sounds are normal. He exhibits no distension. There is no tenderness. There is no rebound and no guarding. Musculoskeletal: He exhibits no edema or tenderness. Trace LE edema Lymphadenopathy:  
  He has no cervical adenopathy. Neurological: He is alert and oriented to person, place, and time. No cranial nerve deficit. Coordination normal.  
No sensory loss, Gait not observed, Motor 5/5 Skin: Skin is warm and dry. Psychiatric: He has a normal mood and affect. His behavior is normal. Judgment and thought content normal.  
Nursing note and vitals reviewed. Diagnostic Study Results Labs - Recent Results (from the past 12 hour(s)) EKG, 12 LEAD, INITIAL Collection Time: 01/16/19  8:39 AM  
Result Value Ref Range Ventricular Rate 100 BPM  
 Atrial Rate 100 BPM  
 P-R Interval 214 ms QRS Duration 124 ms Q-T Interval 338 ms QTC Calculation (Bezet) 436 ms Calculated R Axis 77 degrees Calculated T Axis 57 degrees Diagnosis Sinus rhythm with 1st degree AV block Right bundle branch block Abnormal ECG When compared with ECG of 26-DEC-2018 15:56, 
premature ventricular complexes are no longer present CBC WITH AUTOMATED DIFF  
 Collection Time: 01/16/19  8:45 AM  
Result Value Ref Range WBC 6.2 4.6 - 13.2 K/uL  
 RBC 5.30 4.70 - 5.50 M/uL  
 HGB 15.0 13.0 - 16.0 g/dL HCT 40.4 36.0 - 48.0 % MCV 76.2 74.0 - 97.0 FL  
 MCH 28.3 24.0 - 34.0 PG  
 MCHC 37.1 (H) 31.0 - 37.0 g/dL  
 RDW 14.2 11.6 - 14.5 % PLATELET 038 990 - 884 K/uL MPV 9.2 9.2 - 11.8 FL  
 NEUTROPHILS 63 42 - 75 % LYMPHOCYTES 13 (L) 20 - 51 % MONOCYTES 10 (H) 2 - 9 % EOSINOPHILS 14 (H) 0 - 5 % BASOPHILS 0 0 - 3 %  
 ABS. NEUTROPHILS 3.9 1.8 - 8.0 K/UL  
 ABS. LYMPHOCYTES 0.8 0.8 - 3.5 K/UL  
 ABS. MONOCYTES 0.6 0 - 1.0 K/UL  
 ABS. EOSINOPHILS 0.9 (H) 0.0 - 0.4 K/UL  
 ABS. BASOPHILS 0.0 0.0 - 0.06 K/UL  
 DF MANUAL PLATELET COMMENTS ADEQUATE PLATELETS    
 RBC COMMENTS NORMOCYTIC, NORMOCHROMIC METABOLIC PANEL, COMPREHENSIVE Collection Time: 01/16/19  8:45 AM  
Result Value Ref Range Sodium 130 (L) 136 - 145 mmol/L Potassium 4.1 3.5 - 5.5 mmol/L Chloride 92 (L) 100 - 108 mmol/L  
 CO2 30 21 - 32 mmol/L Anion gap 8 3.0 - 18 mmol/L Glucose 124 (H) 74 - 99 mg/dL BUN 8 7.0 - 18 MG/DL Creatinine 0.93 0.6 - 1.3 MG/DL  
 BUN/Creatinine ratio 9 (L) 12 - 20 GFR est AA >60 >60 ml/min/1.73m2 GFR est non-AA >60 >60 ml/min/1.73m2 Calcium 10.1 8.5 - 10.1 MG/DL Bilirubin, total 0.6 0.2 - 1.0 MG/DL  
 ALT (SGPT) 23 16 - 61 U/L  
 AST (SGOT) 24 15 - 37 U/L Alk. phosphatase 79 45 - 117 U/L Protein, total 7.7 6.4 - 8.2 g/dL Albumin 4.1 3.4 - 5.0 g/dL Globulin 3.6 2.0 - 4.0 g/dL A-G Ratio 1.1 0.8 - 1.7 MAGNESIUM Collection Time: 01/16/19  8:45 AM  
Result Value Ref Range Magnesium 1.7 1.6 - 2.6 mg/dL NT-PRO BNP Collection Time: 01/16/19  8:45 AM  
Result Value Ref Range NT pro-BNP 91 0 - 1,800 PG/ML  
TROPONIN I Collection Time: 01/16/19  8:45 AM  
Result Value Ref Range Troponin-I, QT <0.02 0.00 - 0.06 NG/ML Radiologic Studies -  
XR CHEST SNGL V Final Result IMPRESSION:  
  
 1.  Small 0.7 cm nodular density in the left mid lung zone. Observed on  
12/26/18 as well. Recommend CT for further delineation unless long-term  
stability of this nodule can be confirmed with other available prior studies  
from outside sources. 2.  No acute airspace disease. Medical Decision Making I am the first provider for this patient. I reviewed the vital signs, available nursing notes, past medical history, past surgical history, family history and social history. Vital Signs-Reviewed the patient's vital signs. Pulse Oximetry Analysis -  93 on room air (Interpretation) EKG: Interpreted by the EP. Time Interpreted: 08:39 AM  
 Rate: 100 bpm  
 Rhythm: Sinus Rhythm Interpretation: no STEMI , RBBB Records Reviewed: Nursing Notes and Old Medical Records (Time of Review: 8:37 AM) ED Course: Progress Notes, Reevaluation, and Consults: 
Pt improved and would like to go. Initially attempted to ambulate the patient and pulse ox was in the 80%. Suspected sensor malfunction so retried myself without significant hypoxia. We used a new machine and found it to be 92%. Pt would like to go and is much improved. Pt needs a local PMD as well as pulmonary care. Will give steroids, abx given sputum production, and have him return if at all worsened or concerned. Ana María Browne DO 11:27 AM 
 
Provider Notes (Medical Decision Making): MDM Number of Diagnoses or Management Options Diagnosis management comments: Pt is a 67yo male former smoker with a hx of COPD without pulmonary followup with about monthly exacerbations presents with increasing dyspnea approximately a week after stopping his steroid taper. Pt has an inhaler that is not helping. He denies fever with sputum that is not characterized as more than his baseline. Pt has hypoxia that is responding to oxygen. Will follow cardiac labs, CXR, nebs, steroids, then reevaluate.  Ana María Browne,  8:53 AM 
 
 
 
 Diagnosis Clinical Impression: 1. COPD with acute exacerbation ([facility] [address] Disposition: DC Follow-up Information Follow up With Specialties Details Why Contact Info Noel Best MD Emergency Medicine Schedule an appointment as soon as possible for a visit in 2 days For Saud [address]. [address] [facility] [address] 
[phone] Garnette Ormond, MD Pulmonary Disease, Urgent Care, Internal Medicine Call For [facility] [address] [facility] [address] [facility] [address] 
[phone] [address] [facility] EMERGENCY DEPT Emergency Medicine Go to As needed, If symptoms worsen [place] [address] [phone] Medication List  
  
START taking these medications   
doxycycline 100 mg capsule Commonly known as:  Osiris Conquest Take 1 Cap by mouth two (2) times a day for 7 days. predniSONE 5 mg dose pack Commonly known as:  STERAPRED As directed CONTINUE taking these medications BROMDAY 0.09 % ophthalmic solution Generic drug:  bromfenac 
  
potassium chloride SR 20 mEq tablet Commonly known as:  K-TAB Take 1 Tab by mouth daily. pravastatin 10 mg tablet Commonly known as:  PRAVACHOL 
  
raNITIdine hcl 300 mg Cap Take 300 mg by mouth nightly. TRAVATAN Z 0.004 % ophthalmic solution Generic drug:  travoprost 
  
trihexyphenidyl 5 mg tablet Commonly known as:  ARTANE 
  
valsartan 160 mg tablet Commonly known as:  DIOVAN Where to Get Your Medications Information about where to get these medications is not yet available Ask your nurse or doctor about these medications · doxycycline 100 mg capsule · predniSONE 5 mg dose pack 
  
 
_______________________________ Scribe Attestation Bernarda Mathis (Aj) acting as a scribe for and in the presence of Kavitha Paredes MD     
January 16, 2019 at 9:06 AM 
    
Provider Attestation: I personally performed the services described in the documentation, reviewed the documentation, as recorded by the scribe in my presence, and it accurately and completely records my words and actions. January 16, 2019 at 9:06 AM - Chirag Bowles MD   
_______________________________

## 2019-01-16 NOTE — DISCHARGE INSTRUCTIONS
Patient Education        Chronic Obstructive Pulmonary Disease (COPD): Care Instructions  Your Care Instructions    Chronic obstructive pulmonary disease (COPD) is a general term for a group of lung diseases, including emphysema and chronic bronchitis. People with COPD have decreased airflow in and out of the lungs, which makes it hard to breathe. The airways also can get clogged with thick mucus. Cigarette smoking is a major cause of COPD. Although there is no cure for COPD, you can slow its progress. Following your treatment plan and taking care of yourself can help you feel better and live longer. Follow-up care is a key part of your treatment and safety. Be sure to make and go to all appointments, and call your doctor if you are having problems. It's also a good idea to know your test results and keep a list of the medicines you take. How can you care for yourself at home?   Staying healthy    · Do not smoke. This is the most important step you can take to prevent more damage to your lungs. If you need help quitting, talk to your doctor about stop-smoking programs and medicines. These can increase your chances of quitting for good.     · Avoid colds and flu. Get a pneumococcal vaccine shot. If you have had one before, ask your doctor whether you need a second dose. Get the flu vaccine every fall. If you must be around people with colds or the flu, wash your hands often.     · Avoid secondhand smoke, air pollution, and high altitudes. Also avoid cold, dry air and hot, humid air. Stay at home with your windows closed when air pollution is bad.    Medicines and oxygen therapy    · Take your medicines exactly as prescribed. Call your doctor if you think you are having a problem with your medicine.     · You may be taking medicines such as:  ? Bronchodilators. These help open your airways and make breathing easier. Bronchodilators are either short-acting (work for 6 to 9 hours) or long-acting (work for 24 hours). You inhale most bronchodilators, so they start to act quickly. Always carry your quick-relief inhaler with you in case you need it while you are away from home. ? Corticosteroids (prednisone, budesonide). These reduce airway inflammation. They come in pill or inhaled form. You must take these medicines every day for them to work well.     · A spacer may help you get more inhaled medicine to your lungs. Ask your doctor or pharmacist if a spacer is right for you. If it is, ask how to use it properly.     · Do not take any vitamins, over-the-counter medicine, or herbal products without talking to your doctor first.     · If your doctor prescribed antibiotics, take them as directed. Do not stop taking them just because you feel better. You need to take the full course of antibiotics.     · Oxygen therapy boosts the amount of oxygen in your blood and helps you breathe easier. Use the flow rate your doctor has recommended, and do not change it without talking to your doctor first.   Activity    · Get regular exercise. Walking is an easy way to get exercise. Start out slowly, and walk a little more each day.     · Pay attention to your breathing. You are exercising too hard if you cannot talk while you are exercising.     · Take short rest breaks when doing household chores and other activities.     · Learn breathing methods--such as breathing through pursed lips--to help you become less short of breath.     · If your doctor has not set you up with a pulmonary rehabilitation program, talk to him or her about whether rehab is right for you. Rehab includes exercise programs, education about your disease and how to manage it, help with diet and other changes, and emotional support. Diet    · Eat regular, healthy meals. Use bronchodilators about 1 hour before you eat to make it easier to eat. Eat several small meals instead of three large ones.  Drink beverages at the end of the meal. Avoid foods that are hard to chew.     · Eat foods that contain protein so that you do not lose muscle mass.     · Talk with your doctor if you gain too much weight or if you lose weight without trying.    Mental health    · Talk to your family, friends, or a therapist about your feelings. It is normal to feel frightened, angry, hopeless, helpless, and even guilty. Talking openly about bad feelings can help you cope. If these feelings last, talk to your doctor. When should you call for help? Call 911 anytime you think you may need emergency care. For example, call if:    · You have severe trouble breathing.    Call your doctor now or seek immediate medical care if:    · You have new or worse trouble breathing.     · You cough up blood.     · You have a fever.    Watch closely for changes in your health, and be sure to contact your doctor if:    · You cough more deeply or more often, especially if you notice more mucus or a change in the color of your mucus.     · You have new or worse swelling in your legs or belly.     · You are not getting better as expected. Where can you learn more? Go to http://lowell-simin.info/. Socorro Barajas in the search box to learn more about \"Chronic Obstructive Pulmonary Disease (COPD): Care Instructions. \"  Current as of: December 6, 2017  Content Version: 11.8  © 9012-5500 [employer]. Care instructions adapted under license by [employer] (which disclaims liability or warranty for this information). If you have questions about a medical condition or this instruction, always ask your healthcare professional. [place] [address] any warranty or liability for your use of this information. Patient Education        COPD Exacerbation Plan: Care Instructions  Your Care Instructions    If you have chronic obstructive pulmonary disease (COPD), your usual shortness of breath could suddenly get worse. You may start coughing more and have more mucus.  This flare-up is called a COPD exacerbation (say \"fy-UAB-[id number]-BAY-shun\"). A lung infection or air pollution could set off an exacerbation. Sometimes it can happen after a quick change in temperature or being around chemicals. Work with your doctor to make a plan for dealing with an exacerbation. You can better manage it if you plan ahead. Follow-up care is a key part of your treatment and safety. Be sure to make and go to all appointments, and call your doctor if you are having problems. It's also a good idea to know your test results and keep a list of the medicines you take. How can you care for yourself at home? During an exacerbation  · Do not panic if you start to have one. Quick treatment at home may help you prevent serious breathing problems. If you have a COPD exacerbation plan that you developed with your doctor, follow it. · Take your medicines exactly as your doctor tells you.  ? Use your inhaler as directed by your doctor. If your symptoms do not get better after you use your medicine, have someone take you to the emergency room. Call an ambulance if necessary. ? With inhaled medicines, a spacer or a nebulizer may help you get more medicine to your lungs. Ask your doctor or pharmacist how to use them properly. Practice using the spacer in front of a mirror before you have an exacerbation. This may help you get the medicine into your lungs quickly. ? If your doctor has given you steroid pills, take them as directed. ? Your doctor may have given you a prescription for antibiotics, which you can fill if you need it. ? Talk to your doctor if you have any problems with your medicine. And call your doctor if you have to use your antibiotic or steroid pills. Preventing an exacerbation  · Do not smoke. This is the most important step you can take to prevent more damage to your lungs and prevent problems. If you already smoke, it is never too late to stop.  If you need help quitting, talk to your doctor about stop-smoking programs and medicines. These can increase your chances of quitting for good. · Take your daily medicines as prescribed. · Avoid colds and flu. ? Get a pneumococcal vaccine. ? Get a flu vaccine each year, as soon as it is available. Ask those you live or work with to do the same, so they will not get the flu and infect you. ? Try to stay away from people with colds or the flu. ? Wash your hands often. · Avoid secondhand smoke; air pollution; cold, dry air; hot, humid air; and high altitudes. Stay at home with your windows closed when air pollution is bad. · Learn breathing techniques for COPD, such as breathing through pursed lips. These techniques can help you breathe easier during an exacerbation. When should you call for help? Call 911 anytime you think you may need emergency care. For example, call if:    · You have severe trouble breathing.     · You have severe chest pain.    Call your doctor now or seek immediate medical care if:    · You have new or worse shortness of breath.     · You develop new chest pain.     · You are coughing more deeply or more often, especially if you notice more mucus or a change in the color of your mucus.     · You cough up blood.     · You have new or increased swelling in your legs or belly.     · You have a fever.    Watch closely for changes in your health, and be sure to contact your doctor if:    · You need to use your antibiotic or steroid pills.     · Your symptoms are getting worse. Where can you learn more? Go to http://lowell-simin.info/. Enter D246 in the search box to learn more about \"COPD Exacerbation Plan: Care Instructions. \"  Current as of: December 6, 2017  Content Version: 11.8  © 4821-3889 [employer]. Care instructions adapted under license by [employer] (which disclaims liability or warranty for this information).  If you have questions about a medical condition or this instruction, always ask your healthcare professional. [place] [address] any warranty or liability for your use of this information.

## 2019-01-17 LAB
ATRIAL RATE: 100 BPM
CALCULATED R AXIS, ECG10: 77 DEGREES
CALCULATED T AXIS, ECG11: 57 DEGREES
DIAGNOSIS, 93000: NORMAL
P-R INTERVAL, ECG05: 214 MS
Q-T INTERVAL, ECG07: 338 MS
QRS DURATION, ECG06: 124 MS
QTC CALCULATION (BEZET), ECG08: 436 MS
VENTRICULAR RATE, ECG03: 100 BPM

## 2019-02-18 ENCOUNTER — OFFICE VISIT (OUTPATIENT)
Dept: PULMONOLOGY | Age: 79
End: 2019-02-18

## 2019-02-18 VITALS
HEIGHT: 69 IN | BODY MASS INDEX: 29.33 KG/M2 | HEART RATE: 73 BPM | WEIGHT: 198 LBS | TEMPERATURE: 98.1 F | OXYGEN SATURATION: 97 % | RESPIRATION RATE: 21 BRPM | SYSTOLIC BLOOD PRESSURE: 150 MMHG | DIASTOLIC BLOOD PRESSURE: 80 MMHG

## 2019-02-18 DIAGNOSIS — R91.1 PULMONARY NODULE: ICD-10-CM

## 2019-02-18 DIAGNOSIS — J44.9 COPD WITH ASTHMA (HCC): ICD-10-CM

## 2019-02-18 DIAGNOSIS — J44.9 CHRONIC OBSTRUCTIVE PULMONARY DISEASE, UNSPECIFIED COPD TYPE (HCC): Primary | ICD-10-CM

## 2019-02-18 RX ORDER — BUDESONIDE AND FORMOTEROL FUMARATE DIHYDRATE 160; 4.5 UG/1; UG/1
2 AEROSOL RESPIRATORY (INHALATION) 2 TIMES DAILY
Qty: 1 INHALER | Refills: 5 | Status: SHIPPED | OUTPATIENT
Start: 2019-02-18 | End: 2020-01-07 | Stop reason: SDUPTHER

## 2019-02-18 RX ORDER — BUDESONIDE AND FORMOTEROL FUMARATE DIHYDRATE 160; 4.5 UG/1; UG/1
2 AEROSOL RESPIRATORY (INHALATION) 2 TIMES DAILY
Qty: 1 INHALER | Refills: 0 | Status: SHIPPED | COMMUNITY
Start: 2019-02-18 | End: 2019-11-19

## 2019-02-18 NOTE — PROGRESS NOTES
The pt. States he is having episodes of SOB which he blames on the change in weather. He is given medication by the [place] [id number] and was somewhat hesitant in recognizing his Med List. 
 
He was in the ED 1/16/19 for COPD exacerbation.

## 2019-02-18 NOTE — PROGRESS NOTES
Verbal Order with read back per Adore Duff MD  For PFT smart panel. AMB POC PFT complete w/ bronchodilator AMB POC PFT complete w/o bronchodilator Dr. Lisa See MD will co-sign the orders.

## 2019-02-18 NOTE — PATIENT INSTRUCTIONS
Symbicort 2 puffs twice daily and remember to wash mouth with water and spit it out after inhaling Albuterol 2 inhalations every 4 hours as needed only but if you require albuterol too often to control respiratory symptoms call the office for severe symptoms go to the emergency room Always call for symptoms such as worsening shortness of breath cough productive of discolored mucus

## 2019-02-21 ENCOUNTER — HOSPITAL ENCOUNTER (OUTPATIENT)
Dept: CT IMAGING | Age: 79
Discharge: HOME | End: 2019-02-21
Attending: INTERNAL MEDICINE
Payer: MEDICARE

## 2019-02-21 DIAGNOSIS — R91.1 PULMONARY NODULE: ICD-10-CM

## 2019-02-21 PROCEDURE — 71250 CT THORAX DX C-: CPT

## 2019-03-18 ENCOUNTER — OFFICE VISIT (OUTPATIENT)
Dept: PULMONOLOGY | Age: 79
End: 2019-03-18

## 2019-03-18 VITALS
BODY MASS INDEX: 29.03 KG/M2 | RESPIRATION RATE: 16 BRPM | WEIGHT: 196 LBS | TEMPERATURE: 98.4 F | SYSTOLIC BLOOD PRESSURE: 136 MMHG | HEART RATE: 75 BPM | DIASTOLIC BLOOD PRESSURE: 70 MMHG | HEIGHT: 69 IN | OXYGEN SATURATION: 98 %

## 2019-03-18 DIAGNOSIS — J44.9 CHRONIC OBSTRUCTIVE PULMONARY DISEASE, UNSPECIFIED COPD TYPE (HCC): Primary | ICD-10-CM

## 2019-03-18 DIAGNOSIS — R91.1 PULMONARY NODULE: ICD-10-CM

## 2019-03-18 NOTE — PROGRESS NOTES
[facility]  Pulmonary, Critical Care, and Sleep Medicine      Chief complaint:  Pulmonary nodule and COPD    HPI:    Sharron Dean    is 69years old and returns the office today for follow-up concerning a pulmonary nodule and COPD and relates that he has no chest pain or significant cough at this time although he uses cough medicine at time to control his cough. He also states that he has no gastroesophageal reflux symptoms or trouble swallowing and that his shortness of breath has resolved and he is taking his Symbicort twice daily faithfully      Allergies   Allergen Reactions    Flu Vaccine Qs2014-15(36mo,Up) Nausea and Vomiting     2018 Vaccine    Pcn [Penicillins] Unknown (comments)    Vioxx [Rofecoxib] Unknown (comments)     Current Outpatient Medications   Medication Sig    budesonide-formoterol (SYMBICORT) 160-4.5 mcg/actuation HFAA Take 2 Puffs by inhalation two (2) times a day.  potassium chloride SR (K-TAB) 20 mEq tablet Take 1 Tab by mouth daily.  ranitidine hcl 300 mg cap Take 300 mg by mouth nightly.  pravastatin (PRAVACHOL) 10 mg tablet Take 10 mg by mouth daily.  trihexyphenidyl (ARTANE) 5 mg tablet Take 10 mg by mouth daily. Indications: anxiety    valsartan (DIOVAN) 160 mg tablet Take 160 mg by mouth daily. Indications: HYPERTENSION    bromfenac (BROMDAY) 0.09 % ophthalmic solution Administer 1 Drop to both eyes two (2) times a day.  budesonide-formoterol (SYMBICORT) 160-4.5 mcg/actuation HFAA Take 2 Puffs by inhalation two (2) times a day.  predniSONE (STERAPRED) 5 mg dose pack As directed    travoprost (TRAVATAN Z) 0.004 % ophthalmic solution Administer 1 Drop to both eyes every evening. No current facility-administered medications for this visit.       Past Medical History:   Diagnosis Date    Anxiety     Asthma     no problems    Chronic lung disease     Diabetes ([address]     diet controlled/exercise    Diabetes mellitus ([address]     Hypercholesteremia     Hypertension      Past Surgical History:   Procedure Laterality Date    HX CATARACT REMOVAL      bilateral    HX COLONOSCOPY       Social History     Socioeconomic History    Marital status: UNKNOWN     Spouse name: Not on file    Number of children: Not on file    Years of education: Not on file    Highest education level: Not on file   Social Needs    Financial resource strain: Not on file    Food insecurity - worry: Not on file    Food insecurity - inability: Not on file   [place] needs - medical: Not on file   [place] needs - non-medical: Not on file   Occupational History    Not on file   Tobacco Use    Smoking status: Former Smoker     Packs/day: 3.00     Years: 42.00     Pack years: 126.00     Types: Cigarettes    Smokeless tobacco: Never Used    Tobacco comment: quit smoking in 2002   Substance and Sexual Activity    Alcohol use: Yes     Comment: 2 beers per day    Drug use: No    Sexual activity: Not on file   Other Topics Concern    Not on file   Social History Narrative    Not on file     History reviewed. No pertinent family history. Review of systems:  He denies fever chills poor appetite or weight loss    Physical Exam:  Visit Vitals  /70 (BP 1 Location: Left arm, BP Patient Position: Sitting)   Pulse 75   Temp 98.4 °F (36.9 °C) (Oral)   Resp 16   Ht 5' 9\" (1.753 m)   Wt 88.9 kg (196 lb)   SpO2 98% Comment: RA Rest   BMI 28.94 kg/m²       Well-developed well-nourished  HEENT: WNL  Lymph node exam: Supraclavicular cervical lymph nodes negative  Chest: Equal symmetrical expansion no dullness no wheezes rales rubs  Heart: Regular rhythm no gallop no murmur no JVD no peripheral edema  Extremities: No cyanosis clubbing or calf tenderness  Neurological: Alert and oriented    Labs:  CT of the chest 2/18/19: Pulmonary nodule 10 x 5 mm left upper lobe.   Incidental finding some thickening of the esophagus at the base or could be a hiatal hernia according to radiology  O2 sat room air at rest 98%    Impression:   Possible esophageal thickening without symptoms of GERD dysphasia will not pursue at this time  COPD symptoms improved with Symbicort  Pulmonary nodule per Fleischner Society recommendation follow-up CT  Plan:   Symbicort twice daily  Noncontrast CT of the chest in 6 months for follow-up of pulmonary nodule  Follow-up in 6 months or sooner if needed    Chad Walker MD , [facility]    CC: Warren Ulloa MD [facility]    [address] [facility]  [place], [address] [facility] [address]     P: [phone]     F: [phone]

## 2019-03-18 NOTE — PROGRESS NOTES
Mr. Adithya Wallace has a reminder for a \"due or due soon\" health maintenance. I have asked that he contact his primary care provider for follow-up on this health maintenance. Chief Complaint   Patient presents with    Results      Chest CT     1. Have you been to the ER, urgent care clinic since your last visit? Hospitalized since your last visit? No    2. Have you seen or consulted any other health care providers outside of the [address] [place] [address] since your last visit? Include any pap smears or colon screening.  No

## 2019-03-18 NOTE — PATIENT INSTRUCTIONS
Continue Symbicort 2 inhalations twice daily about 12 hours apart and remember to wash mouth with water and spit it out after inhaling Symbicort    Always call for symptoms such as worsening shortness of breath

## 2019-07-30 ENCOUNTER — TELEPHONE (OUTPATIENT)
Dept: PULMONOLOGY | Age: 79
End: 2019-07-30

## 2019-07-30 NOTE — TELEPHONE ENCOUNTER
Pt states he has a cough that wont go away, producing mucous (unsure of color). Please advise [phone].

## 2019-08-16 ENCOUNTER — HOSPITAL ENCOUNTER (EMERGENCY)
Age: 79
Discharge: HOME | End: 2019-08-16
Attending: EMERGENCY MEDICINE
Payer: MEDICARE

## 2019-08-16 VITALS
DIASTOLIC BLOOD PRESSURE: 79 MMHG | BODY MASS INDEX: 28.65 KG/M2 | WEIGHT: 194 LBS | TEMPERATURE: 98 F | HEART RATE: 72 BPM | SYSTOLIC BLOOD PRESSURE: 147 MMHG | RESPIRATION RATE: 16 BRPM | OXYGEN SATURATION: 100 %

## 2019-08-16 DIAGNOSIS — M25.561 ACUTE PAIN OF RIGHT KNEE: Primary | ICD-10-CM

## 2019-08-16 PROCEDURE — 99281 EMR DPT VST MAYX REQ PHY/QHP: CPT

## 2019-08-16 NOTE — ED PROVIDER NOTES
EMERGENCY DEPARTMENT HISTORY AND PHYSICAL EXAM    Date: 8/16/2019  Patient Name: Ariana Giang    History of Presenting Illness     Chief Complaint   Patient presents with    Knee Pain         History Provided By: Patient  Chief Complaint: Right knee pain  Duration: 2 months  Timing: Constant  Location: Right knee  Quality: Aching  Severity: Mild to moderate  Modifying Factors: Movement  Associated Symptoms: none       Additional History (Context): Ariana Giang is a 69 y.o. male with a history of anxiety, diabetes, hypertension who presents today for a continued history of right knee pain. This is been going on for the past 2 months. Patient reports 2 months ago he fell onto his knees onto Celebrex. Patient reports he was seen and evaluated by ortho and had x-rays that were within normal limits. Patient has not been back to see Ortho. Patient reports his wife has tried many over-the-counter treatments without complete relief. Patient states he does not really like to take medications. Denies any fevers or chills. Denies any nausea or vomiting. Denies any history of gout. PCP: Other, MD Ninfa    Current Outpatient Medications   Medication Sig Dispense Refill    budesonide-formoterol (SYMBICORT) 160-4.5 mcg/actuation HFAA Take 2 Puffs by inhalation two (2) times a day. 1 Inhaler 5    budesonide-formoterol (SYMBICORT) 160-4.5 mcg/actuation HFAA Take 2 Puffs by inhalation two (2) times a day. 1 Inhaler 0    predniSONE (STERAPRED) 5 mg dose pack As directed 48 Tab 0    potassium chloride SR (K-TAB) 20 mEq tablet Take 1 Tab by mouth daily. 20 Tab 0    ranitidine hcl 300 mg cap Take 300 mg by mouth nightly. 90 Cap 3    pravastatin (PRAVACHOL) 10 mg tablet Take 10 mg by mouth daily.  trihexyphenidyl (ARTANE) 5 mg tablet Take 10 mg by mouth daily. Indications: anxiety      valsartan (DIOVAN) 160 mg tablet Take 160 mg by mouth daily.  Indications: HYPERTENSION      travoprost (TRAVATAN Z) 0.004 % ophthalmic solution Administer 1 Drop to both eyes every evening.  bromfenac (BROMDAY) 0.09 % ophthalmic solution Administer 1 Drop to both eyes two (2) times a day. Past History     Past Medical History:  Past Medical History:   Diagnosis Date    Anxiety     Asthma     no problems    Chronic lung disease     Diabetes ([facility]     diet controlled/exercise    Diabetes mellitus ([facility]     Hypercholesteremia     Hypertension        Past Surgical History:  Past Surgical History:   Procedure Laterality Date    HX CATARACT REMOVAL      bilateral    HX COLONOSCOPY         Family History:  No family history on file. Social History:  Social History     Tobacco Use    Smoking status: Former Smoker     Packs/day: 3.00     Years: 42.00     Pack years: 126.00     Types: Cigarettes    Smokeless tobacco: Never Used    Tobacco comment: quit smoking in 2002   Substance Use Topics    Alcohol use: Yes     Comment: 2 beers per day    Drug use: No       Allergies: Allergies   Allergen Reactions    Flu Vaccine Qs2014-15(36mo,Up) Nausea and Vomiting     2018 Vaccine    Pcn [Penicillins] Unknown (comments)    Vioxx [Rofecoxib] Unknown (comments)         Review of Systems   Review of Systems   Constitutional: Negative for chills and fever. HENT: Negative for congestion, rhinorrhea and sore throat. Respiratory: Negative for cough and shortness of breath. Cardiovascular: Negative for chest pain. Gastrointestinal: Negative for abdominal pain, blood in stool, constipation, diarrhea, nausea and vomiting. Genitourinary: Negative for dysuria, frequency and hematuria. Musculoskeletal: Positive for arthralgias. Negative for back pain and myalgias. Skin: Negative for rash and wound. Neurological: Negative for dizziness and headaches. All other systems reviewed and are negative.     All Other Systems Negative  Physical Exam     Vitals:    08/16/19 0845   BP: 147/79   Pulse: 72   Resp: 16   Temp: 98 °F (36.7 °C)   SpO2: 100%   Weight: 88 kg (194 lb)     Physical Exam   Constitutional: He is oriented to person, place, and time. He appears well-developed and well-nourished. No distress. HENT:   Head: Normocephalic and atraumatic. Eyes: Conjunctivae are normal.   Neck: Normal range of motion. Neck supple. Cardiovascular: Normal rate, regular rhythm and normal heart sounds. Pulmonary/Chest: Effort normal and breath sounds normal. No respiratory distress. He exhibits no tenderness. Abdominal: Soft. Bowel sounds are normal. He exhibits no distension. There is no rebound. Musculoskeletal: Normal range of motion. He exhibits no edema or deformity. Right knee: He exhibits normal range of motion, no swelling, no effusion, no ecchymosis, no deformity, no laceration, no LCL laxity, no bony tenderness and no MCL laxity. Tenderness found. Diffuse right knee tenderness. No swelling or erythema noted. No warmth. Neurological: He is alert and oriented to person, place, and time. Skin: Skin is warm and dry. He is not diaphoretic. Psychiatric: He has a normal mood and affect. His behavior is normal.   Nursing note and vitals reviewed. Diagnostic Study Results     Labs -   No results found for this or any previous visit (from the past 12 hour(s)). Radiologic Studies -   No orders to display     CT Results  (Last 48 hours)    None        CXR Results  (Last 48 hours)    None            Medical Decision Making   I am the first provider for this patient. I reviewed the vital signs, available nursing notes, past medical history, past surgical history, family history and social history. Vital Signs-Reviewed the patient's vital signs.       Records Reviewed: Nursing Notes and Old Medical Records     Procedures: None   Procedures    Provider Notes (Medical Decision Making):     Differential Diagnosis: Knee strain, OA, RA, gout, Osgood Schlatters, bursitis, bakers cyst, ACL tear/strain, PCL tear/strain, MCL tear/strain, LCL tear/strain, medial meniscus tear, lateral meniscus tear, patellar dislocation, chrondromalacia patella, septic arthritis    Plan: Given patient recently had negative x-rays will not x-ray here. Patient does not need emergent MRI or CT of the knee. Have strongly encourage patient to follow-up with Ortho again for further evaluation and possible imaging. Have discussed rice care with patient. Offered to discharge home with pain medication but patient denied need for this. Ace wrap was placed on knee prior to discharge patient denies need for crutches. MED RECONCILIATION:  No current facility-administered medications for this encounter. Current Outpatient Medications   Medication Sig    budesonide-formoterol (SYMBICORT) 160-4.5 mcg/actuation HFAA Take 2 Puffs by inhalation two (2) times a day.  budesonide-formoterol (SYMBICORT) 160-4.5 mcg/actuation HFAA Take 2 Puffs by inhalation two (2) times a day.  predniSONE (STERAPRED) 5 mg dose pack As directed    potassium chloride SR (K-TAB) 20 mEq tablet Take 1 Tab by mouth daily.  ranitidine hcl 300 mg cap Take 300 mg by mouth nightly.  pravastatin (PRAVACHOL) 10 mg tablet Take 10 mg by mouth daily.  trihexyphenidyl (ARTANE) 5 mg tablet Take 10 mg by mouth daily. Indications: anxiety    valsartan (DIOVAN) 160 mg tablet Take 160 mg by mouth daily. Indications: HYPERTENSION    travoprost (TRAVATAN Z) 0.004 % ophthalmic solution Administer 1 Drop to both eyes every evening.  bromfenac (BROMDAY) 0.09 % ophthalmic solution Administer 1 Drop to both eyes two (2) times a day. Disposition:  Home     DISCHARGE NOTE:   Pt has been reexamined. Patient has no new complaints, changes, or physical findings. Care plan outlined and precautions discussed. Results of workup were reviewed with the patient. All medications were reviewed with the patient. All of pt's questions and concerns were addressed.  Patient was instructed and agrees to follow up with ortho  as well as to return to the ED upon further deterioration. Patient is ready to go home. Follow-up Information     Follow up With Specialties Details Why Contact Info    [facility] EMERGENCY DEPT Emergency Medicine  As needed [address]  [phone]       Follow-up with your orthopedist            Current Discharge Medication List              Diagnosis     Clinical Impression:   1.  Acute pain of right knee

## 2019-08-16 NOTE — DISCHARGE INSTRUCTIONS

## 2019-09-11 ENCOUNTER — TELEPHONE (OUTPATIENT)
Dept: PULMONOLOGY | Age: 79
End: 2019-09-11

## 2019-09-11 NOTE — TELEPHONE ENCOUNTER
The pt. is contacted in regards to an appt. Next week but, he hasn't had the CT Chest done yet. The pt. asks me to schedule it for him. Bertha Cornelius calls to say she scheduled the pt. On Friday 9/13. The pt. Is able to meet this appt. Kate Velez

## 2019-09-13 ENCOUNTER — HOSPITAL ENCOUNTER (OUTPATIENT)
Dept: CT IMAGING | Age: 79
Discharge: HOME | End: 2019-09-13
Attending: INTERNAL MEDICINE
Payer: MEDICARE

## 2019-09-13 DIAGNOSIS — R91.1 PULMONARY NODULE: ICD-10-CM

## 2019-09-13 PROCEDURE — 71250 CT THORAX DX C-: CPT

## 2019-09-17 ENCOUNTER — OFFICE VISIT (OUTPATIENT)
Dept: PULMONOLOGY | Age: 79
End: 2019-09-17

## 2019-09-17 VITALS
WEIGHT: 196.8 LBS | HEART RATE: 82 BPM | HEIGHT: 69 IN | BODY MASS INDEX: 29.15 KG/M2 | SYSTOLIC BLOOD PRESSURE: 148 MMHG | OXYGEN SATURATION: 99 % | RESPIRATION RATE: 18 BRPM | TEMPERATURE: 98.3 F | DIASTOLIC BLOOD PRESSURE: 72 MMHG

## 2019-09-17 DIAGNOSIS — J44.9 CHRONIC OBSTRUCTIVE PULMONARY DISEASE, UNSPECIFIED COPD TYPE (HCC): Primary | ICD-10-CM

## 2019-09-17 DIAGNOSIS — K22.89 ESOPHAGEAL THICKENING: ICD-10-CM

## 2019-09-17 DIAGNOSIS — R91.1 PULMONARY NODULE: ICD-10-CM

## 2019-09-17 NOTE — PROGRESS NOTES
[facility]  Pulmonary, Critical Care, and Sleep Medicine      Chief complaint:  Pulmonary nodule COPD    HPI:    Matilde Harrison    is 69years old and comes to the office today for follow-up of COPD and pulmonary nodules and relates she has no chest pain no chronic cough he is not coughing up blood he has no significant leg swelling or shortness of breath and he has no problems with swallowing. Allergies   Allergen Reactions    Flu Vaccine Qs2014-15(36mo,Up) Nausea and Vomiting     2018 Vaccine    Pcn [Penicillins] Unknown (comments)    Vioxx [Rofecoxib] Unknown (comments)     Current Outpatient Medications   Medication Sig    budesonide-formoterol (SYMBICORT) 160-4.5 mcg/actuation HFAA Take 2 Puffs by inhalation two (2) times a day.  budesonide-formoterol (SYMBICORT) 160-4.5 mcg/actuation HFAA Take 2 Puffs by inhalation two (2) times a day.  potassium chloride SR (K-TAB) 20 mEq tablet Take 1 Tab by mouth daily.  ranitidine hcl 300 mg cap Take 300 mg by mouth nightly.  pravastatin (PRAVACHOL) 10 mg tablet Take 10 mg by mouth daily.  valsartan (DIOVAN) 160 mg tablet Take 160 mg by mouth daily. Indications: HYPERTENSION    travoprost (TRAVATAN Z) 0.004 % ophthalmic solution Administer 1 Drop to both eyes every evening.  predniSONE (STERAPRED) 5 mg dose pack As directed    trihexyphenidyl (ARTANE) 5 mg tablet Take 10 mg by mouth daily. Indications: anxiety    bromfenac (BROMDAY) 0.09 % ophthalmic solution Administer 1 Drop to both eyes two (2) times a day. No current facility-administered medications for this visit.       Past Medical History:   Diagnosis Date    Anxiety     Asthma     no problems    Chronic lung disease     Diabetes ([address]     diet controlled/exercise    Diabetes mellitus ([address]     Hypercholesteremia     Hypertension      Past Surgical History:   Procedure Laterality Date    HX CATARACT REMOVAL      bilateral    HX COLONOSCOPY       Social History     Socioeconomic History    Marital status: UNKNOWN     Spouse name: Not on file    Number of children: Not on file    Years of education: Not on file    Highest education level: Not on file   Occupational History    Not on file   Social Needs    Financial resource strain: Not on file    Food insecurity:     Worry: Not on file     Inability: Not on file    Transportation needs:     Medical: Not on file     Non-medical: Not on file   Tobacco Use    Smoking status: Former Smoker     Packs/day: 3.00     Years: 42.00     Pack years: 126.00     Types: Cigarettes    Smokeless tobacco: Never Used    Tobacco comment: quit smoking in 2002   Substance and Sexual Activity    Alcohol use: Yes     Comment: 2 beers per day    Drug use: No    Sexual activity: Not on file   Lifestyle    Physical activity:     Days per week: Not on file     Minutes per session: Not on file    Stress: Not on file   Relationships    Social connections:     Talks on phone: Not on file     Gets together: Not on file     Attends Anabaptist service: Not on file     Active member of club or organization: Not on file     Attends meetings of clubs or organizations: Not on file     Relationship status: Not on file    Intimate partner violence:     Fear of current or ex partner: Not on file     Emotionally abused: Not on file     Physically abused: Not on file     Forced sexual activity: Not on file   Other Topics Concern    Not on file   Social History Narrative    Not on file     No family history on file.     Review of systems:  He denies fever chills poor appetite weight loss    Physical Exam:  Visit Vitals  /72 (BP 1 Location: Left arm, BP Patient Position: At rest)   Pulse 82   Temp 98.3 °F (36.8 °C)   Resp 18   Ht 5' 9\" (1.753 m)   Wt 89.3 kg (196 lb 12.8 oz)   SpO2 99%   BMI 29.06 kg/m²       Well-developed well-nourished  HEENT: WNL  Lymph node exam: Supraclavicular cervical lymph nodes negative  Chest: Equal symmetrical expansion no dullness no wheezes rales rubs  Heart: Regular rhythm no gallop no murmur no JVD no peripheral edema  Extremities: No cyanosis clubbing or calf tenderness  Neurological: Alert and oriented    Labs:    O2 sat room air at rest 99%  CT of the chest 9/13/2019 personally reviewed with small nodules except for a 1 cm x 0.4 cm subpleural nodule which is stable from both 6 months ago also was noted some esophageal thickening unchanged    Impression:     Esophageal thickening in a patient who had a esophagitis years ago may need further evaluation  Pulmonary nodule 1 cm x 0.4 cm we will follow-up in 6 months it is not clear if this would be large enough to react with PET study  Continue treatment with Symbicort as by history and physical exam COPD stable    Plan:   Referral to Dr. Alver Heimlich gastroenterology for esophageal thickening  Symbicort PRN albuterol follow-up in 6 months after CT imaging    Doug Mustafa MD , [facility]    CC: Other, MD Ninfa     [address] [facility]  [place], [address] [facility] [address]     P: [phone]     F: [phone]

## 2019-09-17 NOTE — PATIENT INSTRUCTIONS
Continue Symbicort 2 inhalations twice daily and remember to wash mouth with water and spit it out after inhaling    Albuterol 2 inhalations every 4 hours as needed but if you require albuterol too often to control respiratory symptoms call the office    Always call for symptoms such as increasing shortness of breath or if there is a problem with scheduling your CAT scan

## 2019-09-17 NOTE — PROGRESS NOTES
Pt states no cough or angina. Gautam Kc presents today for   Chief Complaint   Patient presents with   Krys Fritz Other     F/U to 3/18       Is someone accompanying this pt? No    Is the patient using any DME equipment during OV? No   -DME Company No    Depression Screening:  3 most recent PHQ Screens 2/18/2019   Little interest or pleasure in doing things Not at all   Feeling down, depressed, irritable, or hopeless Not at all   Total Score PHQ 2 0        Abuse Assesment: Pt denies. Learning Assesment        Fall Risk  Fall Risk Assessment, last 12 mths 9/17/2019   Able to walk? Yes   Fall in past 12 months? Yes   Fall with injury? Yes   Number of falls in past 12 months 1   Fall Risk Score 2         Coordination of Care:  1. Have you been to the ER, urgent care clinic since your last visit? Hospitalized since your last visit? No    2. Have you seen or consulted any other health care providers outside of the [address] [place] [address] since your last visit?  Dr. Odom Estimable PCP    Medication variance in dosage/sig per patient as follows: per med red

## 2019-10-14 ENCOUNTER — TELEPHONE (OUTPATIENT)
Dept: PULMONOLOGY | Age: 79
End: 2019-10-14

## 2019-10-14 NOTE — TELEPHONE ENCOUNTER
Per pt at visit Dr. Benedict Leiva advised to return in 6 months but he got a call from him and advised him to come sooner.

## 2019-10-15 NOTE — TELEPHONE ENCOUNTER
Pt was called by the scheduling team for the CT due 3/2020 back on 9/20. Pt has not called them back to schedule. Number given and he states he will call her back.  Pt advised to make appt after the CT is done to follow up on the CT with a visit

## 2019-11-19 RX ORDER — DIFLUPREDNATE 0.5 MG/ML
1 EMULSION OPHTHALMIC
COMMUNITY

## 2019-11-19 RX ORDER — METFORMIN HYDROCHLORIDE 500 MG/1
250 TABLET ORAL 2 TIMES DAILY
COMMUNITY
Start: 2019-05-23

## 2019-11-19 RX ORDER — TIMOLOL MALEATE 5 MG/ML
1 SOLUTION/ DROPS OPHTHALMIC 2 TIMES DAILY
COMMUNITY
End: 2020-03-06 | Stop reason: DRUGHIGH

## 2019-11-26 ENCOUNTER — ANESTHESIA EVENT (OUTPATIENT)
Dept: ENDOSCOPY | Age: 79
End: 2019-11-26
Payer: MEDICARE

## 2019-11-27 ENCOUNTER — ANESTHESIA (OUTPATIENT)
Dept: ENDOSCOPY | Age: 79
End: 2019-11-27
Payer: MEDICARE

## 2019-11-27 ENCOUNTER — HOSPITAL ENCOUNTER (OUTPATIENT)
Age: 79
Setting detail: OUTPATIENT SURGERY
Discharge: HOME | End: 2019-11-27
Attending: INTERNAL MEDICINE | Admitting: INTERNAL MEDICINE
Payer: MEDICARE

## 2019-11-27 VITALS
RESPIRATION RATE: 113 BRPM | WEIGHT: 189 LBS | BODY MASS INDEX: 27.99 KG/M2 | OXYGEN SATURATION: 97 % | HEIGHT: 69 IN | DIASTOLIC BLOOD PRESSURE: 83 MMHG | TEMPERATURE: 97 F | SYSTOLIC BLOOD PRESSURE: 125 MMHG | HEART RATE: 104 BPM

## 2019-11-27 LAB
BUN BLD-MCNC: 7 MG/DL (ref 7–18)
CHLORIDE BLD-SCNC: 98 MMOL/L (ref 100–108)
GLUCOSE BLD STRIP.AUTO-MCNC: 103 MG/DL (ref 74–106)
GLUCOSE BLD STRIP.AUTO-MCNC: 116 MG/DL (ref 70–110)
GLUCOSE BLD STRIP.AUTO-MCNC: 120 MG/DL (ref 70–110)
HCT VFR BLD CALC: 40 % (ref 36–49)
HGB BLD-MCNC: 13.6 G/DL (ref 12–16)
POTASSIUM BLD-SCNC: 4 MMOL/L (ref 3.5–5.5)
SODIUM BLD-SCNC: 136 MMOL/L (ref 136–145)

## 2019-11-27 PROCEDURE — 82947 ASSAY GLUCOSE BLOOD QUANT: CPT

## 2019-11-27 PROCEDURE — 74011000250 HC RX REV CODE- 250: Performed by: NURSE ANESTHETIST, CERTIFIED REGISTERED

## 2019-11-27 PROCEDURE — 77030008565 HC TBNG SUC IRR ERBE -B: Performed by: INTERNAL MEDICINE

## 2019-11-27 PROCEDURE — 76040000007: Performed by: INTERNAL MEDICINE

## 2019-11-27 PROCEDURE — 76060000032 HC ANESTHESIA 0.5 TO 1 HR: Performed by: INTERNAL MEDICINE

## 2019-11-27 PROCEDURE — 82962 GLUCOSE BLOOD TEST: CPT

## 2019-11-27 PROCEDURE — 88342 IMHCHEM/IMCYTCHM 1ST ANTB: CPT

## 2019-11-27 PROCEDURE — 77030021593 HC FCPS BIOP ENDOSC BSC -A: Performed by: INTERNAL MEDICINE

## 2019-11-27 PROCEDURE — 88305 TISSUE EXAM BY PATHOLOGIST: CPT

## 2019-11-27 PROCEDURE — 74011250636 HC RX REV CODE- 250/636: Performed by: NURSE ANESTHETIST, CERTIFIED REGISTERED

## 2019-11-27 PROCEDURE — 77030019988 HC FCPS ENDOSC DISP BSC -B: Performed by: INTERNAL MEDICINE

## 2019-11-27 PROCEDURE — 77030018846 HC SOL IRR STRL H20 ICUM -A: Performed by: INTERNAL MEDICINE

## 2019-11-27 RX ORDER — LIDOCAINE HYDROCHLORIDE 20 MG/ML
INJECTION, SOLUTION EPIDURAL; INFILTRATION; INTRACAUDAL; PERINEURAL AS NEEDED
Status: DISCONTINUED | OUTPATIENT
Start: 2019-11-27 | End: 2019-11-27 | Stop reason: HOSPADM

## 2019-11-27 RX ORDER — PROPOFOL 10 MG/ML
INJECTION, EMULSION INTRAVENOUS AS NEEDED
Status: DISCONTINUED | OUTPATIENT
Start: 2019-11-27 | End: 2019-11-27 | Stop reason: HOSPADM

## 2019-11-27 RX ORDER — MAGNESIUM SULFATE 100 %
4 CRYSTALS MISCELLANEOUS AS NEEDED
Status: CANCELLED | OUTPATIENT
Start: 2019-11-27

## 2019-11-27 RX ORDER — ONDANSETRON 2 MG/ML
4 INJECTION INTRAMUSCULAR; INTRAVENOUS ONCE
Status: CANCELLED | OUTPATIENT
Start: 2019-11-27 | End: 2019-11-27

## 2019-11-27 RX ORDER — SODIUM CHLORIDE 0.9 % (FLUSH) 0.9 %
5-40 SYRINGE (ML) INJECTION AS NEEDED
Status: CANCELLED | OUTPATIENT
Start: 2019-11-27

## 2019-11-27 RX ORDER — SODIUM CHLORIDE 0.9 % (FLUSH) 0.9 %
5-40 SYRINGE (ML) INJECTION EVERY 8 HOURS
Status: CANCELLED | OUTPATIENT
Start: 2019-11-27

## 2019-11-27 RX ORDER — SODIUM CHLORIDE, SODIUM LACTATE, POTASSIUM CHLORIDE, CALCIUM CHLORIDE 600; 310; 30; 20 MG/100ML; MG/100ML; MG/100ML; MG/100ML
75 INJECTION, SOLUTION INTRAVENOUS CONTINUOUS
Status: CANCELLED | OUTPATIENT
Start: 2019-11-27

## 2019-11-27 RX ORDER — SODIUM CHLORIDE 0.9 % (FLUSH) 0.9 %
5-40 SYRINGE (ML) INJECTION AS NEEDED
Status: DISCONTINUED | OUTPATIENT
Start: 2019-11-27 | End: 2019-11-27 | Stop reason: HOSPADM

## 2019-11-27 RX ORDER — SODIUM CHLORIDE, SODIUM LACTATE, POTASSIUM CHLORIDE, CALCIUM CHLORIDE 600; 310; 30; 20 MG/100ML; MG/100ML; MG/100ML; MG/100ML
75 INJECTION, SOLUTION INTRAVENOUS CONTINUOUS
Status: DISCONTINUED | OUTPATIENT
Start: 2019-11-27 | End: 2019-11-27 | Stop reason: HOSPADM

## 2019-11-27 RX ORDER — SODIUM CHLORIDE, SODIUM LACTATE, POTASSIUM CHLORIDE, CALCIUM CHLORIDE 600; 310; 30; 20 MG/100ML; MG/100ML; MG/100ML; MG/100ML
INJECTION, SOLUTION INTRAVENOUS
Status: DISCONTINUED | OUTPATIENT
Start: 2019-11-27 | End: 2019-11-27 | Stop reason: HOSPADM

## 2019-11-27 RX ORDER — SODIUM CHLORIDE 0.9 % (FLUSH) 0.9 %
5-40 SYRINGE (ML) INJECTION EVERY 8 HOURS
Status: DISCONTINUED | OUTPATIENT
Start: 2019-11-27 | End: 2019-11-27 | Stop reason: HOSPADM

## 2019-11-27 RX ORDER — INSULIN LISPRO 100 [IU]/ML
INJECTION, SOLUTION INTRAVENOUS; SUBCUTANEOUS ONCE
Status: CANCELLED | OUTPATIENT
Start: 2019-11-27 | End: 2019-11-27

## 2019-11-27 RX ORDER — DEXTROSE 50 % IN WATER (D50W) INTRAVENOUS SYRINGE
25-50 AS NEEDED
Status: CANCELLED | OUTPATIENT
Start: 2019-11-27

## 2019-11-27 RX ORDER — LIDOCAINE HYDROCHLORIDE 10 MG/ML
0.1 INJECTION, SOLUTION EPIDURAL; INFILTRATION; INTRACAUDAL; PERINEURAL AS NEEDED
Status: DISCONTINUED | OUTPATIENT
Start: 2019-11-27 | End: 2019-11-27 | Stop reason: HOSPADM

## 2019-11-27 RX ORDER — INSULIN LISPRO 100 [IU]/ML
INJECTION, SOLUTION INTRAVENOUS; SUBCUTANEOUS ONCE
Status: DISCONTINUED | OUTPATIENT
Start: 2019-11-27 | End: 2019-11-27 | Stop reason: HOSPADM

## 2019-11-27 RX ADMIN — PROPOFOL 50 MG: 10 INJECTION, EMULSION INTRAVENOUS at 07:46

## 2019-11-27 RX ADMIN — SODIUM CHLORIDE, SODIUM LACTATE, POTASSIUM CHLORIDE, AND CALCIUM CHLORIDE: 600; 310; 30; 20 INJECTION, SOLUTION INTRAVENOUS at 07:25

## 2019-11-27 RX ADMIN — PROPOFOL 50 MG: 10 INJECTION, EMULSION INTRAVENOUS at 07:43

## 2019-11-27 RX ADMIN — FAMOTIDINE 20 MG: 10 INJECTION INTRAVENOUS at 07:19

## 2019-11-27 RX ADMIN — SODIUM CHLORIDE, SODIUM LACTATE, POTASSIUM CHLORIDE, AND CALCIUM CHLORIDE 75 ML/HR: 600; 310; 30; 20 INJECTION, SOLUTION INTRAVENOUS at 07:00

## 2019-11-27 RX ADMIN — PROPOFOL 50 MG: 10 INJECTION, EMULSION INTRAVENOUS at 07:48

## 2019-11-27 RX ADMIN — PROPOFOL 50 MG: 10 INJECTION, EMULSION INTRAVENOUS at 07:41

## 2019-11-27 RX ADMIN — LIDOCAINE HYDROCHLORIDE 100 MG: 20 INJECTION, SOLUTION EPIDURAL; INFILTRATION; INTRACAUDAL; PERINEURAL at 07:41

## 2019-11-27 NOTE — H&P
[facility]   [email or web address]      Impression:   1.abn ct esophagus   2. crcs      Plan:     1. egd dil colo mac all risk benefits and alt discussed       Chief Complaint: crcs abn ct       HPI:  Severo Bar is a 67 y.o. male who is being seen on consult for crcs abn ct.     PMH:   Past Medical History:   Diagnosis Date    Anxiety     Asthma     no problems    Chronic lung disease     [facility]     Diabetes ([facility]     diet controlled/exercise    Diabetes mellitus ([facility]     Glaucoma     right eye    Hypercholesteremia     Hypertension     Pulmonary nodules        PSH:   Past Surgical History:   Procedure Laterality Date    HX CATARACT REMOVAL      bilateral    HX COLONOSCOPY         Social HX:   Social History     Socioeconomic History    Marital status:      Spouse name: Not on file    Number of children: Not on file    Years of education: Not on file    Highest education level: Not on file   Occupational History    Not on file   Social Needs    Financial resource strain: Not on file    Food insecurity:     Worry: Not on file     Inability: Not on file    Transportation needs:     Medical: Not on file     Non-medical: Not on file   Tobacco Use    Smoking status: Former Smoker     Packs/day: 3.00     Years: 42.00     Pack years: 126.00     Types: Cigarettes     Last attempt to quit: 2002     Years since quittin.9    Smokeless tobacco: Never Used    Tobacco comment: quit smoking in    Substance and Sexual Activity    Alcohol use: Yes     Comment: 2 beers per day    Drug use: No    Sexual activity: Not on file   Lifestyle    Physical activity:     Days per week: Not on file     Minutes per session: Not on file    Stress: Not on file   Relationships    Social connections:     Talks on phone: Not on file     Gets together: Not on file     Attends [employer] service: Not on file     Active member of club or organization: Not on file     Attends meetings of clubs or organizations: Not on file     Relationship status: Not on file    Intimate partner violence:     Fear of current or ex partner: Not on file     Emotionally abused: Not on file     Physically abused: Not on file     Forced sexual activity: Not on file   Other Topics Concern    Not on file   Social History Narrative    Not on file       FHX:   History reviewed. No pertinent family history. Allergy:   Allergies   Allergen Reactions    Flu Vaccine [id number](36mo,Up) Nausea and Vomiting     2018 Vaccine - patient denies    Pcn [Penicillins] Hives    Vioxx [Rofecoxib] Unknown (comments)     Cannot remember reaction       Home Medications:     Medications Prior to Admission   Medication Sig    timolol (TIMOPTIC) 0.5 % ophthalmic solution Administer 1 Drop to both eyes two (2) times a day.  bimatoprost (LUMIGAN) 0.01 % ophthalmic drops Administer 1 Drop to both eyes every evening.  difluprednate (DUREZOL) 0.05 % ophthalmic emulsion Administer 1 Drop to right eye daily as needed.  peg 400-propylene glycol (SYSTANE, PROPYLENE GLYCOL,) 0.4-0.3 % drop 2 Drops as needed.  metFORMIN (GLUCOPHAGE) 500 mg tablet Take 250 mg by mouth two (2) times a day.  budesonide-formoterol (SYMBICORT) 160-4.5 mcg/actuation HFAA Take 2 Puffs by inhalation two (2) times a day.  potassium chloride SR (K-TAB) 20 mEq tablet Take 1 Tab by mouth daily.  pravastatin (PRAVACHOL) 10 mg tablet Take 10 mg by mouth daily.  valsartan (DIOVAN) 160 mg tablet Take 160 mg by mouth daily. Indications: HYPERTENSION    bromfenac (BROMDAY) 0.09 % ophthalmic solution Administer 1 Drop to both eyes two (2) times a day. Review of Systems:     Constitutional: No fevers, chills, weight loss, fatigue. Skin: No rashes, pruritis, jaundice, ulcerations, erythema. HENT: No headaches, nosebleeds, sinus pressure, rhinorrhea, sore throat.    Eyes: No visual changes, blurred vision, eye pain, photophobia, jaundice. Cardiovascular: No chest pain, heart palpitations. Respiratory: No cough, SOB, wheezing, chest discomfort, orthopnea. Gastrointestinal: Neg    Genitourinary: No dysuria, bleeding, discharge, pyuria. Musculoskeletal: No weakness, arthralgias, wasting. Endo: No sweats. Heme: No bruising, easy bleeding. Allergies: As noted. Neurological: Cranial nerves intact. Alert and oriented. Gait not assessed. Psychiatric:  No anxiety, depression, hallucinations. Visit Vitals  Ht 5' 9\" (1.753 m)   Wt 88.5 kg (195 lb)   BMI 28.80 kg/m²       Physical Assessment:     constitutional: appearance: well developed, well nourished, normal habitus, no deformities, in no acute distress. skin: inspection: no rashes, ulcers, icterus or other lesions; no clubbing or telangiectasias. palpation: no induration or subcutaneos nodules. eyes: inspection: normal conjunctivae and lids; no jaundice pupils: symmetrical, normoreactive to light, normal accommodation and size. ENMT: mouth: normal oral mucosa,lips and gums; good dentition. oropharynx: normal tongue, hard and soft palate; posterior pharynx without erythema, exudate or lesions. neck: no masses organomegaly or tenderness. respiratory: effort: normal chest excursion; no intercostal retraction or accessory muscle use. cardiovascular: abdominal aorta: normal size and position; no bruits. palpation: PMI of normal size and position; normal rhythm; no thrill or murmurs. abdominal: abdomen: normal consistency; no tenderness or masses. hernias: no hernias appreciated. liver: normal size and consistency. spleen: not palpable. rectal: hemoccult/guaiac: not performed. musculoskeletal: no deformities or muscle wasting   lymphatic: axilae: not palpable. groin: not palpable. neck: within normal limits. other: not palpable.    neurologic: cranial nerves: II-XII normal.   psychiatric: judgement/insight: within normal limits. memory: within normal limits for recent and remote events. mood and affect: no evidence of depression, anxiety or agitation. orientation: oriented to time, space and person. Basic Metabolic Profile   No results for input(s): NA, K, CL, CO2, BUN, GLU, CA, MG, PHOS in the last 72 hours. No lab exists for component: CREAT      CBC w/Diff    No results for input(s): WBC, RBC, HGB, HCT, MCV, MCH, MCHC, RDW, PLT, HGBEXT, HCTEXT, PLTEXT in the last 72 hours. No lab exists for component: MPV No results for input(s): GRANS, LYMPH, EOS, PRO, MYELO, METAS, BLAST in the last 72 hours. No lab exists for component: MONO, BASO     Hepatic Function   No results for input(s): ALB, TP, TBILI, GPT, SGOT, AP, AML, LPSE in the last 72 hours. No lab exists for component: Libertad Charles MD, M.D. [facility], [address] [place] [address]  [email or web address]

## 2019-11-27 NOTE — ANESTHESIA PREPROCEDURE EVALUATION
Relevant Problems   No relevant active problems       Anesthetic History   No history of anesthetic complications            Review of Systems / Medical History  Patient summary reviewed and pertinent labs reviewed    Pulmonary    COPD: moderate        Asthma : well controlled       Neuro/Psych              Cardiovascular    Hypertension: well controlled              Exercise tolerance: >4 METS     GI/Hepatic/Renal     GERD: well controlled           Endo/Other    Diabetes: well controlled, type 2         Other Findings              Physical Exam    Airway  Mallampati: II  TM Distance: 4 - 6 cm  Neck ROM: normal range of motion   Mouth opening: Normal     Cardiovascular  Regular rate and rhythm,  S1 and S2 normal,  no murmur, click, rub, or gallop             Dental      Comments: Few lower teeth in mouth   Pulmonary  Breath sounds clear to auscultation               Abdominal  GI exam deferred       Other Findings            Anesthetic Plan    ASA: 3  Anesthesia type: MAC          Induction: Intravenous  Anesthetic plan and risks discussed with: Patient

## 2019-11-27 NOTE — DISCHARGE INSTRUCTIONS
Gastritis: Care Instructions  Your Care Instructions    Gastritis is a sore and upset stomach. It happens when something irritates the stomach lining. Many things can cause it. These include an infection such as the flu or something you ate or drank. Medicines or a sore on the lining of the stomach (ulcer) also can cause it. Your belly may bloat and ache. You may belch, vomit, and feel sick to your stomach. You should be able to relieve the problem by taking medicine. And it may help to change your diet. If gastritis lasts, your doctor may prescribe medicine. Follow-up care is a key part of your treatment and safety. Be sure to make and go to all appointments, and call your doctor if you are having problems. It's also a good idea to know your test results and keep a list of the medicines you take. How can you care for yourself at home? · If your doctor prescribed antibiotics, take them as directed. Do not stop taking them just because you feel better. You need to take the full course of antibiotics. · Be safe with medicines. If your doctor prescribed medicine to decrease stomach acid, take it as directed. Call your doctor if you think you are having a problem with your medicine. · Do not take any other medicine, including over-the-counter pain relievers, without talking to your doctor first.  · If your doctor recommends over-the-counter medicine to reduce stomach acid, such as Pepcid AC, Prilosec, Tagamet HB, or Zantac 75, follow the directions on the label. · Drink plenty of fluids (enough so that your urine is light yellow or clear like water) to prevent dehydration. Choose water and other caffeine-free clear liquids. If you have kidney, heart, or liver disease and have to limit fluids, talk with your doctor before you increase the amount of fluids you drink. · Limit how much alcohol you drink. · Avoid coffee, tea, cola drinks, chocolate, and other foods with caffeine. They increase stomach acid.   When should you call for help? Call 911 anytime you think you may need emergency care. For example, call if:    · You vomit blood or what looks like coffee grounds.     · You pass maroon or very bloody stools.    Call your doctor now or seek immediate medical care if:    · You start breathing fast and have not produced urine in the last 8 hours.     · You cannot keep fluids down.    Watch closely for changes in your health, and be sure to contact your doctor if:    · You do not get better as expected. Where can you learn more? Go to http://lowell-simin.info/. Enter [id number] in the search box to learn more about \"Gastritis: Care Instructions. \"  Current as of: November 7, 2018  Content Version: 12.2  © 5500-1082 [employer]. Care instructions adapted under license by [employer] (which disclaims liability or warranty for this information). If you have questions about a medical condition or this instruction, always ask your healthcare professional. [place] [address] any warranty or liability for your use of this information. Bell's Esophagus: Care Instructions  Your Care Instructions    The esophagus is the tube that connects the throat to the stomach. Food and liquids go through this tube. In Bell's esophagus, the cells that line the tube change. This is usually because of gastroesophageal reflux disease (GERD). GERD causes acid from your stomach to back up into the esophagus. When you have Bell's esophagus, you are slightly more likely to get cancer of the esophagus. So regular testing is important to watch for signs of this cancer. You can treat GERD to control your symptoms and feel better. Follow-up care is a key part of your treatment and safety. Be sure to make and go to all appointments, and call your doctor if you are having problems. It's also a good idea to know your test results and keep a list of the medicines you take.   How can you care for yourself at home? · Take your medicines exactly as prescribed. Call your doctor if you think you are having a problem with your medicine. · If you take over-the-counter medicine, such as antacids or acid reducers, follow all instructions on the label. If you use these medicines often, talk with your doctor. Be careful when you take over-the-counter antacid medicines. Many of these medicines have aspirin in them. Read the label to make sure that you are not taking more than the recommended dose. Too much aspirin can be harmful. · Do not smoke or chew tobacco. Smoking can make GERD worse. If you need help quitting, talk to your doctor about stop-smoking programs and medicines. These can increase your chances of quitting for good. · Chocolate, mint, and alcohol can make GERD worse. They relax the valve between the esophagus and the stomach. · Spicy foods, foods that have a lot of acid (like tomatoes and oranges), and coffee can make GERD symptoms worse in some people. If your symptoms are worse after you eat a certain food, you may want to stop eating that food to see if your symptoms get better. · Eat smaller meals, and more often. After eating, wait 2 to 3 hours before you lie down. · Raise the head of your bed 6 to 8 inches by putting blocks under the frame or a foam wedge under the head of the mattress. · Do not wear tight clothing around your midsection. · If you are overweight, lose weight. Even losing 5 to 10 pounds can help. When should you call for help? Call your doctor now or seek immediate medical care if:    · You have new or worse belly pain.     · You are vomiting.    Watch closely for changes in your health, and be sure to contact your doctor if:    · You have any pain or difficulty swallowing.     · You are losing weight.     · You have new or worse symptoms, such as heartburn.     · You do not get better as expected. Where can you learn more?   Go to http://lowell-simin.info/. Enter L146 in the search box to learn more about \"Bell's Esophagus: Care Instructions. \"  Current as of: November 7, 2018  Content Version: 12.2  © 4804-8179 [employer]. Care instructions adapted under license by [employer] (which disclaims liability or warranty for this information). If you have questions about a medical condition or this instruction, always ask your healthcare professional. [address] any warranty or liability for your use of this information. Learning About Diverticulosis and Diverticulitis  What are diverticulosis and diverticulitis? In diverticulosis and diverticulitis, pouches called diverticula form in the wall of the large intestine, or colon. · In diverticulosis, the pouches do not cause any pain or other symptoms. · In diverticulitis, the pouches get inflamed or infected and cause symptoms. Doctors aren't sure what causes these pouches in the colon. But they think that a low-fiber diet may play a role. Without fiber to add bulk to the stool, the colon has to work harder than normal to push the stool forward. The pressure from this may cause pouches to form in weak spots along the colon. Some people with diverticulosis get diverticulitis. But experts don't know why this happens. What are the symptoms? · In diverticulosis, most people don't have symptoms. But pouches sometimes bleed. · In diverticulitis, symptoms may last from a few hours to a week or more. They include:  ? Belly pain. This is usually in the lower left side. It is sometimes worse when you move. This is the most common symptom. ? Fever and chills. ? Bloating and gas. ? Diarrhea or constipation. ? Nausea and sometimes vomiting.  ? Not feeling like eating. How can you prevent these problems? You may be able to lower your chance of getting diverticulitis.  You can do this by taking steps to prevent constipation. · Eat fruits, vegetables, beans, and whole grains every day. These foods are high in fiber. · Drink plenty of fluids (enough so that your urine is light yellow or clear like water). If you have kidney, heart, or liver disease and have to limit fluids, talk with your doctor before you increase the amount of fluids you drink. · Get at least 30 minutes of exercise on most days of the week. Walking is a good choice. You also may want to do other activities, such as running, swimming, cycling, or playing tennis or team sports. · Take a fiber supplement, such as Citrucel or Metamucil, every day if needed. Read and follow all instructions on the label. · Schedule time each day for a bowel movement. Having a daily routine may help. Take your time and do not strain when having a bowel movement. Some people avoid nuts, seeds, berries, and popcorn. They believe that these foods might get trapped in the diverticula and cause pain. But there is no proof that these foods cause diverticulitis or make it worse. How are these problems treated? · The best way to treat diverticulosis is to avoid constipation. (See the tips above.)  · Treatment for diverticulitis includes antibiotics and often a change in your diet. You may need only liquids at first. Your doctor may suggest pain medicines for pain or belly cramps. In some cases, surgery may be needed. Follow-up care is a key part of your treatment and safety. Be sure to make and go to all appointments, and call your doctor if you are having problems. It's also a good idea to know your test results and keep a list of the medicines you take. Where can you learn more? Go to http://lowell-simin.info/. Enter N593 in the search box to learn more about \"Learning About Diverticulosis and Diverticulitis. \"  Current as of: November 7, 2018  Content Version: 12.2  © 3777-1683 [employer].  Care instructions adapted under license by [facility] (which disclaims liability or warranty for this information). If you have questions about a medical condition or this instruction, always ask your healthcare professional. [address] any warranty or liability for your use of this information. DISCHARGE SUMMARY from Nurse    PATIENT INSTRUCTIONS:    After general anesthesia or intravenous sedation, for 24 hours or while taking prescription Narcotics:  · Limit your activities  · Do not drive and operate hazardous machinery  · Do not make important personal or business decisions  · Do  not drink alcoholic beverages  · If you have not urinated within 8 hours after discharge, please contact your surgeon on call. Report the following to your surgeon:  · Excessive pain, swelling, redness or odor of or around the surgical area  · Temperature over 100.5  · Nausea and vomiting lasting longer than 4 hours or if unable to take medications  · Any signs of decreased circulation or nerve impairment to extremity: change in color, persistent  numbness, tingling, coldness or increase pain  · Any questions    *  Please give a list of your current medications to your Primary Care Provider. *  Please update this list whenever your medications are discontinued, doses are      changed, or new medications (including over-the-counter products) are added. *  Please carry medication information at all times in case of emergency situations. These are general instructions for a healthy lifestyle:    No smoking/ No tobacco products/ Avoid exposure to second hand smoke  Surgeon General's Warning:  Quitting smoking now greatly reduces serious risk to your health.     Obesity, smoking, and sedentary lifestyle greatly increases your risk for illness    A healthy diet, regular physical exercise & weight monitoring are important for maintaining a healthy lifestyle    You may be retaining fluid if you have a history of heart failure or if you experience any of the following symptoms:  Weight gain of 3 pounds or more overnight or 5 pounds in a week, increased swelling in our hands or feet or shortness of breath while lying flat in bed. Please call your doctor as soon as you notice any of these symptoms; do not wait until your next office visit. The discharge information has been reviewed with the patient and spouse. The patient and spouse verbalized understanding. Discharge medications reviewed with the patient and spouse and appropriate educational materials and side effects teaching were provided.   ___________________________________________________________________________________________________________________________________

## 2019-11-27 NOTE — ANESTHESIA POSTPROCEDURE EVALUATION
Procedure(s):  Upper endoscopy with gatric and esophageal bx's  COLONOSCOPY. MAC    Anesthesia Post Evaluation      Multimodal analgesia: multimodal analgesia used between 6 hours prior to anesthesia start to PACU discharge  Patient location during evaluation: bedside  Patient participation: complete - patient participated  Level of consciousness: awake  Pain management: adequate  Airway patency: patent  Anesthetic complications: no  Cardiovascular status: stable  Respiratory status: acceptable  Hydration status: acceptable  Post anesthesia nausea and vomiting:  controlled      Vitals Value Taken Time   /66 11/27/2019  8:21 AM   Temp 36.7 °C (98.1 °F) 11/27/2019  8:05 AM   Pulse 104 11/27/2019  8:24 AM   Resp 25 11/27/2019  8:24 AM   SpO2 99 % 11/27/2019  8:24 AM   Vitals shown include unvalidated device data.

## 2020-01-07 NOTE — TELEPHONE ENCOUNTER
Pt states he needs a refill for Symbicort to be sent to [facility]. Please advise [phone].

## 2020-01-08 RX ORDER — BUDESONIDE AND FORMOTEROL FUMARATE DIHYDRATE 160; 4.5 UG/1; UG/1
2 AEROSOL RESPIRATORY (INHALATION) 2 TIMES DAILY
Qty: 1 INHALER | Refills: 5 | Status: SHIPPED | OUTPATIENT
Start: 2020-01-08 | End: 2020-07-20 | Stop reason: SDUPTHER

## 2020-03-02 ENCOUNTER — HOSPITAL ENCOUNTER (OUTPATIENT)
Dept: CT IMAGING | Age: 80
Discharge: HOME | End: 2020-03-02
Attending: INTERNAL MEDICINE
Payer: MEDICARE

## 2020-03-02 DIAGNOSIS — R91.1 PULMONARY NODULE: ICD-10-CM

## 2020-03-02 PROCEDURE — 71250 CT THORAX DX C-: CPT

## 2020-03-06 PROBLEM — H25.9 AGE-RELATED CATARACT OF BOTH EYES: Status: ACTIVE | Noted: 2019-05-23

## 2020-03-06 PROBLEM — D50.9 IRON DEFICIENCY ANEMIA: Status: ACTIVE | Noted: 2019-05-23

## 2020-03-06 PROBLEM — R00.0 TACHYCARDIA: Status: ACTIVE | Noted: 2019-05-23

## 2020-03-06 RX ORDER — ASCORBIC ACID 500 MG
500 TABLET ORAL DAILY
COMMUNITY

## 2020-03-06 RX ORDER — ALBUTEROL SULFATE 90 UG/1
2 AEROSOL, METERED RESPIRATORY (INHALATION)
COMMUNITY
End: 2020-07-20 | Stop reason: SDUPTHER

## 2020-03-06 RX ORDER — LANOLIN ALCOHOL/MO/W.PET/CERES
325 CREAM (GRAM) TOPICAL DAILY
COMMUNITY

## 2020-03-06 RX ORDER — TIMOLOL MALEATE 5 MG/ML
SOLUTION/ DROPS OPHTHALMIC
COMMUNITY
Start: 2019-04-24

## 2020-03-06 RX ORDER — FUROSEMIDE 20 MG/1
20 TABLET ORAL DAILY
COMMUNITY
Start: 2019-08-27

## 2020-03-06 RX ORDER — HYDROCHLOROTHIAZIDE 25 MG/1
25 TABLET ORAL DAILY
COMMUNITY
Start: 2018-04-23

## 2020-03-06 RX ORDER — DILTIAZEM HYDROCHLORIDE 300 MG/1
300 CAPSULE, EXTENDED RELEASE ORAL DAILY
COMMUNITY
Start: 2018-02-18

## 2020-03-06 RX ORDER — GUAIFENESIN 600 MG/1
600 TABLET, EXTENDED RELEASE ORAL
COMMUNITY
Start: 2019-08-27

## 2020-03-06 RX ORDER — ESOMEPRAZOLE MAGNESIUM 40 MG/1
40 CAPSULE, DELAYED RELEASE ORAL DAILY
COMMUNITY
Start: 2020-01-09

## 2020-03-09 ENCOUNTER — OFFICE VISIT (OUTPATIENT)
Dept: PULMONOLOGY | Age: 80
End: 2020-03-09

## 2020-03-09 VITALS
WEIGHT: 194.6 LBS | RESPIRATION RATE: 18 BRPM | HEART RATE: 84 BPM | OXYGEN SATURATION: 98 % | TEMPERATURE: 98 F | SYSTOLIC BLOOD PRESSURE: 130 MMHG | BODY MASS INDEX: 28.82 KG/M2 | HEIGHT: 69 IN | DIASTOLIC BLOOD PRESSURE: 78 MMHG

## 2020-03-09 DIAGNOSIS — J44.9 CHRONIC OBSTRUCTIVE PULMONARY DISEASE, UNSPECIFIED COPD TYPE (HCC): Primary | ICD-10-CM

## 2020-03-09 DIAGNOSIS — R91.1 PULMONARY NODULE: ICD-10-CM

## 2020-03-09 DIAGNOSIS — R91.8 PULMONARY NODULES: ICD-10-CM

## 2020-03-09 NOTE — PROGRESS NOTES
The pt. Is non complaining. Neto Contreras presents today for   Chief Complaint   Patient presents with   Christiano Beasley     F/U to 9/17 CT 3/2 GI referral report in [facility]       Is someone accompanying this pt? No  Is the patient using any DME equipment during [address]?    -DME Company     Depression Screening:  3 most recent PHQ Screens 2/18/2019   Little interest or pleasure in doing things Not at all   Feeling down, depressed, irritable, or hopeless Not at all   Total Score PHQ 2 0       Learning Assessment:  Learning Assessment 9/17/2019   PRIMARY LEARNER Patient   HIGHEST LEVEL OF EDUCATION - PRIMARY LEARNER  > 4 YEARS OF COLLEGE   BARRIERS PRIMARY LEARNER NONE   CO-LEARNER CAREGIVER No   CO-LEARNER NAME No   CO-LEARNER HIGHEST LEVEL OF EDUCATION > 4 YEARS OF COLLEGE   BARRIERS CO-LEARNER NONE   PRIMARY LANGUAGE ENGLISH    NEED No   LEARNER PREFERENCE PRIMARY DEMONSTRATION   ANSWERED BY Pt   RELATIONSHIP SELF       Abuse Screening:  The pt. denies    Fall Risk  Fall Risk Assessment, last 12 mths 9/17/2019   Able to walk? Yes   Fall in past 12 months? Yes   Fall with injury? Yes   Number of falls in past 12 months 1   Fall Risk Score 2         Coordination of Care:  1. Have you been to the ER, urgent care clinic since your last visit? Hospitalized since your last visit? No    2. Have you seen or consulted any other health care providers outside of the [address] [place] [address] since your last visit? [facility]    Medication variance in dosage/sig per patient as follows: Per Med. Rec. Candance Huger

## 2020-03-09 NOTE — PROGRESS NOTES
[facility]  Pulmonary, Critical Care, and Sleep Medicine      Chief complaint:  COPD    HPI:    Tree Wolff    is 67years old returns the office today relating that he has no significant cough and also denies having chest pain leg swelling and has mild dyspnea on exertion which is stable. He uses Symbicort regularly and rarely requires albuterol and also states that he is no longer exercising regularly because of knee pain. He also relates no problems with sleeping and tries to get a nap every day but is unable to do this because of his schedule      Allergies   Allergen Reactions    Flu Vaccine Qs2014-15(36mo,Up) Nausea and Vomiting     2018 Vaccine - patient denies    Pcn [Penicillins] Hives    Vioxx [Rofecoxib] Unknown (comments)     Cannot remember reaction     Current Outpatient Medications   Medication Sig    hydroCHLOROthiazide (HYDRODIURIL) 25 mg tablet Take 25 mg by mouth daily.  guaiFENesin ER (MUCINEX) 600 mg ER tablet Take 600 mg by mouth two (2) times daily as needed.  furosemide (LASIX) 20 mg tablet Take 20 mg by mouth daily.  ferrous sulfate 325 mg (65 mg iron) tablet Take 325 mg by mouth daily.  esomeprazole (NEXIUM) 40 mg capsule Take 40 mg by mouth daily.  albuterol (PROVENTIL HFA, VENTOLIN HFA, PROAIR HFA) 90 mcg/actuation inhaler Take 2 Puffs by inhalation every four (4) hours as needed for Shortness of Breath.  ascorbic acid, vitamin C, (VITAMIN C) 500 mg tablet Take 500 mg by mouth daily.  dilTIAZem (TIAZAC) 300 mg SR capsule Take 300 mg by mouth daily.  budesonide-formoterol (SYMBICORT) 160-4.5 mcg/actuation HFAA Take 2 Puffs by inhalation two (2) times a day.  bimatoprost (LUMIGAN) 0.01 % ophthalmic drops Administer 1 Drop to both eyes every evening.  difluprednate (DUREZOL) 0.05 % ophthalmic emulsion Administer 1 Drop to right eye daily as needed.     peg 400-propylene glycol (SYSTANE, PROPYLENE GLYCOL,) 0.4-0.3 % drop 2 Drops as needed.  metFORMIN (GLUCOPHAGE) 500 mg tablet Take 250 mg by mouth two (2) times a day.  potassium chloride SR (K-TAB) 20 mEq tablet Take 1 Tab by mouth daily.  pravastatin (PRAVACHOL) 10 mg tablet Take 10 mg by mouth daily.  valsartan (DIOVAN) 160 mg tablet Take 160 mg by mouth daily. Indications: HYPERTENSION    bromfenac (BROMDAY) 0.09 % ophthalmic solution Administer 1 Drop to both eyes two (2) times a day.  timolol (TIMOPTIC) 0.5 % ophthalmic solution instill 1 drop into both eyes once daily     No current facility-administered medications for this visit.       Past Medical History:   Diagnosis Date    Anxiety     Asthma     no problems    Chronic lung disease     Chronic obstructive pulmonary disease (HCC)     Diabetes (HCC)     diet controlled/exercise    Diabetes mellitus (HCC)     Glaucoma     right eye    Hypercholesteremia     Hypertension     Pulmonary nodules      Past Surgical History:   Procedure Laterality Date    COLONOSCOPY N/A 2019    COLONOSCOPY performed by Louis Vitale MD at [facility]    HX CATARACT REMOVAL      bilateral    HX COLONOSCOPY       Social History     Socioeconomic History    Marital status: UNKNOWN     Spouse name: Not on file    Number of children: Not on file    Years of education: Not on file    Highest education level: Not on file   Occupational History    Not on file   Social Needs    Financial resource strain: Not on file    Food insecurity:     Worry: Not on file     Inability: Not on file    Transportation needs:     Medical: Not on file     Non-medical: Not on file   Tobacco Use    Smoking status: Former Smoker     Packs/day: 3.00     Years: 42.00     Pack years: 126.00     Types: Cigarettes     Last attempt to quit: 2002     Years since quittin.1    Smokeless tobacco: Never Used    Tobacco comment: quit smoking in    Substance and Sexual Activity    Alcohol use: Yes     Comment: 2 beers per day    Drug use: No    Sexual activity: Not on file   Lifestyle    Physical activity:     Days per week: Not on file     Minutes per session: Not on file    Stress: Not on file   Relationships    Social connections:     Talks on phone: Not on file     Gets together: Not on file     Attends Mandaen service: Not on file     Active member of club or organization: Not on file     Attends meetings of clubs or organizations: Not on file     Relationship status: Not on file    Intimate partner violence:     Fear of current or ex partner: Not on file     Emotionally abused: Not on file     Physically abused: Not on file     Forced sexual activity: Not on file   Other Topics Concern    Not on file   Social History Narrative    Not on file     No family history on file. Review of systems:  He denies fever chills poor appetite or weight loss    Physical Exam:  Visit Vitals  /78 (BP 1 Location: Left arm, BP Patient Position: Sitting)   Pulse 84   Temp 98 °F (36.7 °C)   Resp 18   Ht 5' 9\" (1.753 m)   Wt 88.3 kg (194 lb 9.6 oz)   SpO2 98%   BMI 28.74 kg/m²       Well-developed well-nourished  HEENT: WNL  Lymph node exam: Supraclavicular cervical lymph nodes negative  Chest: Equal symmetrical expansion no dullness no wheezes rales rubs attenuated breath sounds  Heart: Regular rhythm no gallop no murmur no JVD no peripheral edema  Extremities: No cyanosis clubbing or calf tenderness  Neurological: Alert and oriented    Labs:  CT of the chest 3/2/20: Stable nodules  O2 sat room air at rest 98%    Impression:   Stable pulmonary nodules  COPD by history and physical exam stable    Plan:   Encourage exercise  Continue Spiriva PRN albuterol  Follow-up CT in 1 year  Follow-up in 1 year or sooner if needed    Claudeen Sequin, MD , [facility]    CC: Other, MD Ninfa     [address] [facility]  [place], [address] [address],[place] [address]     P: [phone]     F: [phone]

## 2020-03-25 ENCOUNTER — HOSPITAL ENCOUNTER (OUTPATIENT)
Dept: CT IMAGING | Age: 80
Discharge: HOME | End: 2020-03-25
Attending: INTERNAL MEDICINE
Payer: MEDICARE

## 2020-03-25 DIAGNOSIS — R91.8 PULMONARY NODULES: ICD-10-CM

## 2020-03-25 PROCEDURE — 71250 CT THORAX DX C-: CPT

## 2020-07-20 ENCOUNTER — OFFICE VISIT (OUTPATIENT)
Dept: PULMONOLOGY | Age: 80
End: 2020-07-20

## 2020-07-20 VITALS
TEMPERATURE: 98 F | WEIGHT: 197.4 LBS | SYSTOLIC BLOOD PRESSURE: 148 MMHG | HEART RATE: 82 BPM | DIASTOLIC BLOOD PRESSURE: 83 MMHG | OXYGEN SATURATION: 95 % | RESPIRATION RATE: 16 BRPM | BODY MASS INDEX: 29.15 KG/M2

## 2020-07-20 DIAGNOSIS — J44.9 CHRONIC OBSTRUCTIVE PULMONARY DISEASE, UNSPECIFIED COPD TYPE (HCC): Primary | ICD-10-CM

## 2020-07-20 RX ORDER — ALBUTEROL SULFATE 90 UG/1
2 AEROSOL, METERED RESPIRATORY (INHALATION)
Qty: 1 INHALER | Refills: 3 | Status: SHIPPED | OUTPATIENT
Start: 2020-07-20

## 2020-07-20 RX ORDER — BUDESONIDE AND FORMOTEROL FUMARATE DIHYDRATE 160; 4.5 UG/1; UG/1
2 AEROSOL RESPIRATORY (INHALATION) 2 TIMES DAILY
Qty: 1 INHALER | Refills: 5 | Status: SHIPPED | OUTPATIENT
Start: 2020-07-20 | End: 2020-09-17 | Stop reason: SDUPTHER

## 2020-07-20 NOTE — PROGRESS NOTES
Heather Lara presents today for   Chief Complaint   Patient presents with    Follow-up    Lung Nodule    COPD       Is someone accompanying this pt? no    Is the patient using any DME equipment during OV? no    -DME Company n/a    Depression Screening:  3 most recent PHQ Screens 7/20/2020   Little interest or pleasure in doing things Not at all   Feeling down, depressed, irritable, or hopeless Not at all   Total Score PHQ 2 0       Learning Assessment:  Learning Assessment 9/17/2019   PRIMARY LEARNER Patient   HIGHEST LEVEL OF EDUCATION - PRIMARY LEARNER  > 4 YEARS OF COLLEGE   BARRIERS PRIMARY LEARNER NONE   CO-LEARNER CAREGIVER No   CO-LEARNER NAME No   CO-LEARNER HIGHEST LEVEL OF EDUCATION > 4 YEARS [facility]   PRIMARY LANGUAGE ENGLISH    NEED No   LEARNER PREFERENCE PRIMARY DEMONSTRATION   ANSWERED BY Pt   RELATIONSHIP SELF       Abuse Screening:  No flowsheet data found. Fall Risk  Fall Risk Assessment, last 12 mths 7/20/2020   Able to walk? Yes   Fall in past 12 months? No   Fall with injury? -   Number of falls in past 12 months -   Fall Risk Score -         Coordination of Care:  1. Have you been to the ER, urgent care clinic since your last visit? Hospitalized since your last visit? No    2. Have you seen or consulted any other health care providers outside of the [address] [place] [address] since your last visit? Include any pap smears or colon screening.      Medication variance in dosage/sig per patient as follows:

## 2020-07-20 NOTE — PATIENT INSTRUCTIONS
Continue Symbicort 2 puffs twice daily and remember to wash mouth with water and spit it out after inhaling    Continue albuterol 2 puffs every 4 hours as needed only if you require albuterol too often to control respiratory symptoms call the office for severe symptoms go to the emergency room    Always call for symptoms such as worsening shortness of breath

## 2020-07-20 NOTE — PROGRESS NOTES
[facility]  Pulmonary, Critical Care, and Sleep Medicine      Chief complaint:    Pulmonary nodules and COPD    HPI:    Everton Wynne    is 67years old and returns the office today for follow-up he denies any chest pain he says his cough is improved and his shortness of breath is improved and he is walking up to a mile at a time. He continues to have some mild leg edema and is using his Symbicort and PRN albuterol several times a day      Allergies   Allergen Reactions    Flu Vaccine Qs2014-15(36mo,Up) Nausea and Vomiting     2018 Vaccine - patient denies    Pcn [Penicillins] Hives    Vioxx [Rofecoxib] Unknown (comments)     Cannot remember reaction     Current Outpatient Medications   Medication Sig    albuterol (PROVENTIL HFA, VENTOLIN HFA, PROAIR HFA) 90 mcg/actuation inhaler Take 2 Puffs by inhalation every four (4) hours as needed for Shortness of Breath.  budesonide-formoteroL (Symbicort) 160-4.5 mcg/actuation HFAA Take 2 Puffs by inhalation two (2) times a day.  hydroCHLOROthiazide (HYDRODIURIL) 25 mg tablet Take 25 mg by mouth daily.  furosemide (LASIX) 20 mg tablet Take 20 mg by mouth daily.  esomeprazole (NEXIUM) 40 mg capsule Take 40 mg by mouth daily.  ascorbic acid, vitamin C, (VITAMIN C) 500 mg tablet Take 500 mg by mouth daily.  dilTIAZem (TIAZAC) 300 mg SR capsule Take 300 mg by mouth daily.  bimatoprost (LUMIGAN) 0.01 % ophthalmic drops Administer 1 Drop to both eyes every evening.  difluprednate (DUREZOL) 0.05 % ophthalmic emulsion Administer 1 Drop to right eye daily as needed.  peg 400-propylene glycol (SYSTANE, PROPYLENE GLYCOL,) 0.4-0.3 % drop 2 Drops as needed.  metFORMIN (GLUCOPHAGE) 500 mg tablet Take 250 mg by mouth two (2) times a day.  potassium chloride SR (K-TAB) 20 mEq tablet Take 1 Tab by mouth daily.  pravastatin (PRAVACHOL) 10 mg tablet Take 10 mg by mouth daily.     valsartan (DIOVAN) 160 mg tablet Take 160 mg by mouth daily. Indications: HYPERTENSION    bromfenac (BROMDAY) 0.09 % ophthalmic solution Administer 1 Drop to both eyes two (2) times a day.  timolol (TIMOPTIC) 0.5 % ophthalmic solution instill 1 drop into both eyes once daily    guaiFENesin ER (MUCINEX) 600 mg ER tablet Take 600 mg by mouth two (2) times daily as needed.  ferrous sulfate 325 mg (65 mg iron) tablet Take 325 mg by mouth daily. No current facility-administered medications for this visit.       Past Medical History:   Diagnosis Date    Anxiety     Asthma     no problems    Chronic lung disease     Chronic obstructive pulmonary disease (HCC)     Diabetes (HCC)     diet controlled/exercise    Diabetes mellitus (HCC)     Glaucoma     right eye    Hypercholesteremia     Hypertension     Pulmonary nodules      Past Surgical History:   Procedure Laterality Date    COLONOSCOPY N/A 2019    COLONOSCOPY performed by Rosanna Closs, MD at [facility]    HX CATARACT REMOVAL      bilateral    HX COLONOSCOPY       Social History     Socioeconomic History    Marital status: UNKNOWN     Spouse name: Not on file    Number of children: Not on file    Years of education: Not on file    Highest education level: Not on file   Occupational History    Not on file   Social Needs    Financial resource strain: Not on file    Food insecurity     Worry: Not on file     Inability: Not on file    Transportation needs     Medical: Not on file     Non-medical: Not on file   Tobacco Use    Smoking status: Former Smoker     Packs/day: 3.00     Years: 42.00     Pack years: 126.00     Types: Cigarettes     Last attempt to quit: 2002     Years since quittin.5    Smokeless tobacco: Never Used    Tobacco comment: quit smoking in    Substance and Sexual Activity    Alcohol use: Yes     Comment: 2 beers per day    Drug use: No    Sexual activity: Not on file   Lifestyle    Physical activity     Days per week: Not on file     Minutes per session: Not on file    Stress: Not on file   Relationships    Social connections     Talks on phone: Not on file     Gets together: Not on file     Attends Orthodoxy service: Not on file     Active member of club or organization: Not on file     Attends meetings of clubs or organizations: Not on file     Relationship status: Not on file    Intimate partner violence     Fear of current or ex partner: Not on file     Emotionally abused: Not on file     Physically abused: Not on file     Forced sexual activity: Not on file   Other Topics Concern    Not on file   Social History Narrative    Not on file     History reviewed. No pertinent family history. Review of systems:  He denies fever chills poor appetite weight loss    Physical Exam:  Visit Vitals  /83 (BP 1 Location: Right arm, BP Patient Position: Sitting)   Pulse 82   Temp 98 °F (36.7 °C) (Oral)   Resp 16   Wt 89.5 kg (197 lb 6.4 oz)   SpO2 95% Comment: RA Rest   BMI 29.15 kg/m²       Well-developed well-nourished  HEENT: WNL  Lymph node exam: Supraclavicular cervical lymph nodes negative  Chest: Equal symmetrical expansion no dullness no wheezes rales rubs  Heart: Regular rhythm no gallop no murmur no JVD bilateral trace to 1+ ankle edema  Extremities: No cyanosis clubbing or calf tenderness  Neurological: Alert and oriented    Labs:    O2 sat room air at rest 95%  CT of the chest 3/25/2020: With unchanged elongated pleural-based lesion for 1 year    Impression:     COPD by history physical exam stable on Symbicort  Pleural-based lesion stable    Plan: Will repeat chest CT in 1 year  Follow-up in 6 months on Symbicort and PRN albuterol  Encouragement for exercise    Raquel Moore MD , [facility]    CC: Other, MD Ninfa     [address] [facility]  [place], [address] [address],[place] [address]     P: [phone]     F: [phone]

## 2020-09-17 RX ORDER — BUDESONIDE AND FORMOTEROL FUMARATE DIHYDRATE 160; 4.5 UG/1; UG/1
2 AEROSOL RESPIRATORY (INHALATION) 2 TIMES DAILY
Qty: 1 INHALER | Refills: 5 | Status: SHIPPED | OUTPATIENT
Start: 2020-09-17 | End: 2021-11-04 | Stop reason: SDUPTHER

## 2020-09-17 NOTE — TELEPHONE ENCOUNTER
Pt ULICES([phone]). Needs refill for his Symbicort sent to [facility] [phone].

## 2021-05-26 ENCOUNTER — HOSPITAL ENCOUNTER (EMERGENCY)
Age: 81
Discharge: HOME | End: 2021-05-26
Attending: EMERGENCY MEDICINE
Payer: MEDICARE

## 2021-05-26 VITALS
WEIGHT: 198 LBS | TEMPERATURE: 98.7 F | DIASTOLIC BLOOD PRESSURE: 86 MMHG | BODY MASS INDEX: 29.33 KG/M2 | RESPIRATION RATE: 20 BRPM | HEIGHT: 69 IN | SYSTOLIC BLOOD PRESSURE: 129 MMHG | OXYGEN SATURATION: 97 % | HEART RATE: 96 BPM
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
## 2021-05-26 PROCEDURE — 99283 EMERGENCY DEPT VISIT LOW MDM: CPT

## 2021-05-26 NOTE — ED TRIAGE NOTES
Pt lid off of the couch onto floor around 9am this morning. Denies injury. Denies hitting head. Wife states he was \"not himself\" after fall. Pt denies this and states he feels fine.

## 2021-07-16 ENCOUNTER — TELEPHONE (OUTPATIENT)
Dept: PULMONOLOGY | Age: 81
End: 2021-07-16

## 2021-07-16 NOTE — TELEPHONE ENCOUNTER
Ejf pt currently scheduled for follow up w/ Dr. Cornelius Aguirre. Pt states he has a yearly ct scan w/ Dr. Amilcar Holman, no order in system. Pt requesting new order to complete ct prior to September follow up appt. Please advise [phone].

## 2021-07-19 DIAGNOSIS — R91.8 LUNG NODULES: Primary | ICD-10-CM

## 2021-08-06 ENCOUNTER — HOSPITAL ENCOUNTER (OUTPATIENT)
Dept: CT IMAGING | Age: 81
Discharge: HOME | End: 2021-08-06
Attending: INTERNAL MEDICINE
Payer: MEDICARE

## 2021-08-06 DIAGNOSIS — R91.8 LUNG NODULES: ICD-10-CM

## 2021-08-06 PROCEDURE — 71250 CT THORAX DX C-: CPT

## 2021-09-10 PROBLEM — M17.11 OSTEOARTHRITIS OF RIGHT KNEE: Status: ACTIVE | Noted: 2021-03-03

## 2021-09-10 RX ORDER — OXYCODONE HYDROCHLORIDE 10 MG/1
10 TABLET ORAL
COMMUNITY
Start: 2021-03-03

## 2021-09-10 RX ORDER — SILDENAFIL 100 MG/1
TABLET, FILM COATED ORAL
COMMUNITY
Start: 2021-06-07 | End: 2022-01-28

## 2021-09-10 RX ORDER — ONDANSETRON 4 MG/1
4 TABLET, FILM COATED ORAL
COMMUNITY
Start: 2021-03-03

## 2021-09-10 RX ORDER — CALCIUM POLYCARBOPHIL 625 MG
1 TABLET ORAL DAILY
COMMUNITY

## 2021-09-10 RX ORDER — AMOXICILLIN 250 MG
1 CAPSULE ORAL DAILY
COMMUNITY
Start: 2021-03-03

## 2021-09-10 RX ORDER — LOXAPINE SUCCINATE 10 MG/1
10 TABLET ORAL
COMMUNITY

## 2021-09-10 RX ORDER — SPIRONOLACTONE 25 MG/1
TABLET ORAL
COMMUNITY
Start: 2021-06-21 | End: 2022-06-22

## 2021-09-14 ENCOUNTER — OFFICE VISIT (OUTPATIENT)
Dept: PULMONOLOGY | Age: 81
End: 2021-09-14
Payer: MEDICARE

## 2021-09-14 VITALS
HEART RATE: 83 BPM | TEMPERATURE: 97.1 F | WEIGHT: 187.6 LBS | OXYGEN SATURATION: 98 % | SYSTOLIC BLOOD PRESSURE: 151 MMHG | BODY MASS INDEX: 27.78 KG/M2 | HEIGHT: 69 IN | RESPIRATION RATE: 18 BRPM | DIASTOLIC BLOOD PRESSURE: 92 MMHG

## 2021-09-14 DIAGNOSIS — R91.1 LUNG NODULE: ICD-10-CM

## 2021-09-14 DIAGNOSIS — Z87.891 FORMER SMOKER: ICD-10-CM

## 2021-09-14 DIAGNOSIS — J44.9 COPD, MODERATE (HCC): Primary | ICD-10-CM

## 2021-09-14 PROCEDURE — G8432 DEP SCR NOT DOC, RNG: HCPCS | Performed by: INTERNAL MEDICINE

## 2021-09-14 PROCEDURE — G9231 DOC ESRD DIA TRANS PREG: HCPCS | Performed by: INTERNAL MEDICINE

## 2021-09-14 PROCEDURE — 99214 OFFICE O/P EST MOD 30 MIN: CPT | Performed by: INTERNAL MEDICINE

## 2021-09-14 PROCEDURE — G8419 CALC BMI OUT NRM PARAM NOF/U: HCPCS | Performed by: INTERNAL MEDICINE

## 2021-09-14 PROCEDURE — G8427 DOCREV CUR MEDS BY ELIG CLIN: HCPCS | Performed by: INTERNAL MEDICINE

## 2021-09-14 PROCEDURE — G8536 NO DOC ELDER MAL SCRN: HCPCS | Performed by: INTERNAL MEDICINE

## 2021-09-14 PROCEDURE — 1101F PT FALLS ASSESS-DOCD LE1/YR: CPT | Performed by: INTERNAL MEDICINE

## 2021-09-14 NOTE — PROGRESS NOTES
HISTORY OF PRESENT ILLNESS  Sander Hernandez is a 79 y.o. male following up for COPD and lung nodules. Former smoker with COPD last FEV1 50% in 2019. Symptoms are maintained on Symbicort. Pt notes minimal use of prn Albuterol. No interval ED or hospital admissions for COPD. No chest pain, chronic cough, fever or hemoptysis. No new respiratory symptoms. Pt with baseline SOB on moderate exertion although states ambulation is limited more by musculoskeletal issues. Review of Systems   Constitutional: Negative for chills, diaphoresis, fever, malaise/fatigue and weight loss. HENT: Negative for congestion, ear discharge, ear pain, hearing loss, nosebleeds, sinus pain, sore throat and tinnitus. Eyes: Negative for blurred vision, double vision, photophobia, pain, discharge and redness. Respiratory: Negative for cough, hemoptysis, sputum production, wheezing and stridor. Shortness of breath: with exertion. Cardiovascular: Positive for leg swelling. Negative for chest pain, palpitations, orthopnea, claudication and PND. Gastrointestinal: Negative for abdominal pain, blood in stool, constipation, diarrhea, heartburn, melena, nausea and vomiting. Genitourinary: Negative for dysuria, flank pain, frequency, hematuria and urgency. Musculoskeletal: Positive for back pain and joint pain. Negative for falls, myalgias and neck pain. Skin: Negative for itching and rash. Neurological: Negative for dizziness, tingling, tremors, sensory change, speech change, focal weakness, seizures, loss of consciousness, weakness and headaches. Endo/Heme/Allergies: Negative for environmental allergies and polydipsia. Does not bruise/bleed easily. Psychiatric/Behavioral: Negative for depression, hallucinations, memory loss, substance abuse and suicidal ideas. The patient is not nervous/anxious and does not have insomnia.       Past Medical History:   Diagnosis Date    Anxiety     Asthma     no problems    Chronic lung disease  Chronic obstructive pulmonary disease (HCC)     Diabetes (HCC)     diet controlled/exercise    Diabetes mellitus ([facility] [address]     Glaucoma     right eye    Hypercholesteremia     Hypertension     Pulmonary nodules      Past Surgical History:   Procedure Laterality Date    COLONOSCOPY N/A 11/27/2019    COLONOSCOPY performed by Kalyan Griffiths MD at [address] [place] [address] HX CATARACT REMOVAL      bilateral    HX COLONOSCOPY       Current Outpatient Medications on File Prior to Visit   Medication Sig Dispense Refill    ondansetron hcl (ZOFRAN) 4 mg tablet Take 4 mg by mouth three (3) times daily as needed.  oxyCODONE IR (ROXICODONE) 10 mg tab immediate release tablet Take 10 mg by mouth every four (4) hours as needed.  senna-docusate (PERICOLACE) 8.6-50 mg per tablet Take 1 Tablet by mouth daily.  sildenafil citrate (VIAGRA) 100 mg tablet TAKE ONE TABLET BY MOUTH AS DIRECTED 30 MINUTES TO 4 HOURS PRIOR TO SEXUAL ACTIVITY. NOT TO EXCEED ONE DOSE/24 HOURS      spironolactone (ALDACTONE) 25 mg tablet TAKE ONE TABLET BY MOUTH EVERY MORNING (WATER PILLS)      calcium polycarbophiL (FIBERCON) 625 mg tablet Take 1 Tablet by mouth daily.  loxapine (LOXITANE) 10 mg capsule Take 10 mg by mouth.  budesonide-formoteroL (Symbicort) 160-4.5 mcg/actuation HFAA Take 2 Puffs by inhalation two (2) times a day. 1 Inhaler 5    albuterol (PROVENTIL HFA, VENTOLIN HFA, PROAIR HFA) 90 mcg/actuation inhaler Take 2 Puffs by inhalation every four (4) hours as needed for Shortness of Breath. 1 Inhaler 3    timolol (TIMOPTIC) 0.5 % ophthalmic solution instill 1 drop into both eyes once daily      hydroCHLOROthiazide (HYDRODIURIL) 25 mg tablet Take 25 mg by mouth daily.  guaiFENesin ER (MUCINEX) 600 mg ER tablet Take 600 mg by mouth two (2) times daily as needed.  furosemide (LASIX) 20 mg tablet Take 20 mg by mouth daily.  ferrous sulfate 325 mg (65 mg iron) tablet Take 325 mg by mouth daily.       esomeprazole (NEXIUM) 40 mg capsule Take 40 mg by mouth daily.  ascorbic acid, vitamin C, (VITAMIN C) 500 mg tablet Take 500 mg by mouth daily.  dilTIAZem (TIAZAC) 300 mg SR capsule Take 300 mg by mouth daily.  bimatoprost (LUMIGAN) 0.01 % ophthalmic drops Administer 1 Drop to both eyes every evening.  difluprednate (DUREZOL) 0.05 % ophthalmic emulsion Administer 1 Drop to right eye daily as needed.  peg 400-propylene glycol (SYSTANE, PROPYLENE GLYCOL,) 0.4-0.3 % drop 2 Drops as needed.  metFORMIN (GLUCOPHAGE) 500 mg tablet Take 250 mg by mouth two (2) times a day.  potassium chloride SR (K-TAB) 20 mEq tablet Take 1 Tab by mouth daily. 20 Tab 0    pravastatin (PRAVACHOL) 10 mg tablet Take 10 mg by mouth daily.  valsartan (DIOVAN) 160 mg tablet Take 160 mg by mouth daily. Indications: HYPERTENSION      bromfenac (BROMDAY) 0.09 % ophthalmic solution Administer 1 Drop to both eyes two (2) times a day. No current facility-administered medications on file prior to visit. Allergies   Allergen Reactions    Flu Vaccine [id number](36mo,Up) Nausea and Vomiting     2018 Vaccine - patient denies    Pcn [Penicillins] Hives    Vioxx [Rofecoxib] Unknown (comments)     Cannot remember reaction     No family history on file.   Social History     Socioeconomic History    Marital status:      Spouse name: Not on file    Number of children: Not on file    Years of education: Not on file    Highest education level: Not on file   Occupational History    Not on file   Tobacco Use    Smoking status: Former Smoker     Packs/day: 3.00     Years: 42.00     Pack years: 126.00     Types: Cigarettes     Quit date:      Years since quittin.7    Smokeless tobacco: Never Used    Tobacco comment: quit smoking in    Substance and Sexual Activity    Alcohol use: Yes     Comment: 2 beers per day    Drug use: No    Sexual activity: Not on file   Other Topics Concern    Not on file   Social History Narrative    Not on file     Social Determinants of Health     Financial Resource Strain:     Difficulty of Paying Living Expenses:    Food Insecurity:     Worried About Running Out of Food in the Last Year:     [address] in the Last Year:    Transportation Needs:     Lack of Transportation (Medical):  Lack of Transportation (Non-Medical):    Physical Activity:     Days of Exercise per Week:     Minutes of Exercise per Session:    Stress:     Feeling of Stress :    Social Connections:     Frequency of Communication with Friends and Family:     Frequency of Social Gatherings with Friends and Family:     Attends Hoahaoism Services:     Active Member of Clubs or Organizations:     Attends Club or Organization Meetings:     Marital Status:    Intimate Partner Violence:     Fear of Current or Ex-Partner:     Emotionally Abused:     Physically Abused:     Sexually Abused: There were no vitals taken for this visit. Physical Exam  Constitutional:       General: He is not in acute distress. Appearance: He is not ill-appearing or toxic-appearing. Comments: Uses a cane   HENT:      Head: Normocephalic and atraumatic. Right Ear: External ear normal.      Left Ear: External ear normal.      Nose:      Comments: Deferred      Mouth/Throat:      Comments: Deferred   Eyes:      General: No scleral icterus. Right eye: No discharge. Left eye: No discharge. Extraocular Movements: Extraocular movements intact. Conjunctiva/sclera: Conjunctivae normal.      Pupils: Pupils are equal, round, and reactive to light. Neck:      Vascular: No carotid bruit. Cardiovascular:      Rate and Rhythm: Normal rate and regular rhythm. Pulses: Normal pulses. Heart sounds: Normal heart sounds. No murmur heard. No gallop. Pulmonary:      Effort: Pulmonary effort is normal. No respiratory distress. Breath sounds: No stridor.  No wheezing, rhonchi or rales. Comments: Distant breath sounds  Chest:      Chest wall: No tenderness. Abdominal:      Palpations: Abdomen is soft. There is no mass. Tenderness: There is no abdominal tenderness. Comments: Large ventral hernia   Musculoskeletal:         General: No swelling, deformity or signs of injury. Cervical back: No rigidity or tenderness. Right lower leg: Right lower leg edema: 1+      Left lower leg: Left lower leg edema: 1+   Lymphadenopathy:      Cervical: No cervical adenopathy. Skin:     General: Skin is warm and dry. Coloration: Skin is not jaundiced or pale. Findings: No bruising, erythema, lesion or rash. Neurological:      Mental Status: He is alert and oriented to person, place, and time. Coordination: Coordination normal.      Gait: Abnormal gait: antalgic. Psychiatric:         Mood and Affect: Mood normal.         Behavior: Behavior normal.         Thought Content: Thought content normal.         Judgment: Judgment normal.       CT Results (most recent):  Results from Hospital Encounter encounter on 08/06/21    CT CHEST WO CONT    Narrative  CT chest without enhancement    INDICATION: Pulmonary nodule follow-up. TECHNIQUE: 5 mm collimation axial images obtained from the thoracic inlet to the  level of the diaphragm without administration of low osmolar, nonionic  intravenous contrast.    Dose reduction techniques used: Automated exposure control, adjustment of the  mAs and/or kVp according to patient size, standardized low-dose protocol, and/or  iterative reconstruction technique. COMPARISON: March 25, 2020. CHEST FINDINGS:    Lymph nodes: No adenopathy by size criteria. Thyroid: Unremarkable. Mediastinum: Heart size is normal. Coronary artery disease and atherosclerosis. Aortic valve leaflet calcifications. The esophagus is underdistended. Lungs: Mild emphysema. There is central bronchial wall thickening.  There is a  stable subpleural nodule in the left upper lobe measuring approximately 10 mm on  image 22. There is a stable 3 mm right upper lobe nodule on image 30. There is  no consolidative pneumonia or pulmonary edema. There are scattered atelectasis  and scarring. ABDOMEN:    There is a stable left adrenal nodule which is low in density compatible with a  benign adenoma. There is colonic diverticulosis. .    Bones: Degenerative changes of the spine. Impression  Stable pulmonary nodules. Recommend follow-up chest CT in one to 2 years based  on Fleischner Society guidelines. ASSESSMENT and PLAN  Encounter Diagnoses   Name Primary?  COPD, moderate ([address] Yes    Lung nodule     Former smoker        Pt with COPD, stable symptoms on Symbicort. Will obtain PFTs and monitor symptoms for progression and consideration for triple therapy. Otherwise would continue current LABA/ICS and prn Albuterol  Nodule is stable almost 2 years. Schedule repeat CT in July 2022, recent CT results reviewed with pt. Advised flu vaccine with PCP.   RTC 6 months, PFT then

## 2021-09-14 NOTE — PROGRESS NOTES
Jaydon Allison presents today for   Chief Complaint   Patient presents with    Results     Chest CT 8/6/21       Is someone accompanying this pt? No    Is the patient using any DME equipment during OV? No    -DME Company NA    Depression Screening:  3 most recent PHQ Screens 7/20/2020   Little interest or pleasure in doing things Not at all   Feeling down, depressed, irritable, or hopeless Not at all   Total Score PHQ 2 0       Learning Assessment:  Learning Assessment 9/17/2019   PRIMARY LEARNER Patient   HIGHEST LEVEL OF EDUCATION - PRIMARY LEARNER  > 4 YEARS OF COLLEGE   BARRIERS PRIMARY LEARNER NONE   CO-LEARNER CAREGIVER No   CO-LEARNER NAME No   CO-LEARNER HIGHEST LEVEL OF EDUCATION > 4 YEARS OF COLLEGE   BARRIERS CO-LEARNER NONE   PRIMARY LANGUAGE ENGLISH    NEED No   LEARNER PREFERENCE PRIMARY DEMONSTRATION   ANSWERED BY Pt   RELATIONSHIP SELF       Abuse Screening:  No flowsheet data found. Fall Risk  Fall Risk Assessment, last 12 mths 7/20/2020   Able to walk? Yes   Fall in past 12 months? No   Number of falls in past 12 months -   Fall with injury? -         Coordination of Care:  1. Have you been to the ER, urgent care clinic since your last visit? Hospitalized since your last visit? No    2. Have you seen or consulted any other health care providers outside of the [address] [place] [address] since your last visit? Include any pap smears or colon screening.  No

## 2021-11-01 NOTE — ED PROVIDER NOTES
EMERGENCY DEPARTMENT HISTORY AND PHYSICAL EXAM    Date: 5/26/2021  Patient Name: Ana María Cota    History of Presenting Illness     Chief Complaint   Patient presents with    Fall         History Provided By: Patient and mother    Chief Complaint: Fall  Duration: Prior to arrival  Timing: Acute  Location: N/A  Quality: Asymptomatic   Severity: Mild  Modifying Factors: Getting up on exam was difficult secondary to her recent right knee replacement  Associated Symptoms: none       Additional History (Context): Ana María Cota is a 79 y.o. male with a history of diabetes, COPD, hypertension and recent right knee replacement who presents today for issues listed above. Patient reports he was home alone while his wife was out for an appointment when he went to get up from the bed and had some right knee pain which resulted in him lowering himself to the floor. Patient's wife states when she got home she found him on the floor. Patient states the fall was very controlled. Denies any his head or LOC. Denies any neck pain, back pain, chest pain, shortness of breath or abdominal pain. Has no other complaints at this time. States he feels well. Denies any recent illness, fevers or chills. Is not on any blood thinners. The wife states she mainly called 911 because she was not able to get him up on her own. He typically has been walking around with a cane or walker after having his right knee replaced this past March. PCP: Ninfa Song MD    Current Outpatient Medications   Medication Sig Dispense Refill    budesonide-formoteroL (Symbicort) 160-4.5 mcg/actuation HFAA Take 2 Puffs by inhalation two (2) times a day. 1 Inhaler 5    albuterol (PROVENTIL HFA, VENTOLIN HFA, PROAIR HFA) 90 mcg/actuation inhaler Take 2 Puffs by inhalation every four (4) hours as needed for Shortness of Breath.  1 Inhaler 3    timolol (TIMOPTIC) 0.5 % ophthalmic solution instill 1 drop into both eyes once daily      hydroCHLOROthiazide URGENT CARE VISIT NOTE    Chief Complaint   Patient presents with   • Ear Problem     Left ear pain started yesterday, right ear today. No drainage from the ears. Not able to hear out of left ear.   • Cough     Onset one week ago today, with nasal congestion. Feels frontal facial pain. No nausea, vomiting, diarrhea. No known Covid exposure. No vaccine.        HISTORY     Susi Ma is a 21 year old female who presents to urgent care clinic with a 7 day history of the above-listed symptoms.  Patient states that her cough is productive.  She has not measured any fevers but she has had some sweats and chills.      Medications, Past medical history and Allergies were reviewed in medical record    REVIEW OF SYSTEMS    See history of present illness otherwise pertinent positive/negatives include:  Patient continues to have a lot of nasal drainage and congestion.  She has not had any nausea or vomiting.  Last night her ears started to hurt and she had some loss of hearing in the left ear.      PHYSICAL EXAM    Vital Signs:    Vitals:    11/01/21 1518   BP: 129/65   Pulse: (!) 102   Resp: 20   Temp: 98.7 °F (37.1 °C)   TempSrc: Temporal   SpO2: 98%   Weight: 79.3 kg (174 lb 15 oz)   PainSc: 10    PainLoc: Face   LMP: 02/01/2021     General:  Well developed, well-nourished female in no acute distress.    HEENT: Normocephalic, extraocular movements are intact, pupils are equally reactive to light and accommodation. Tympanic membranes are intact with a pearly gray color and good cone of light, there is mild erythema and bulging is noted, external canals are patent with no excessive cerumen and no erythema or inflammation noted. Oropharynx is erythematous and moist without exudates or lesions. Nasal passages are patent with mild erythema and edema noted.  Chest: Clear to auscultation all fields.  No wheezing crackles or crepitus.  No accessory muscle use.  Cardiovascular:  Regular rate & rhythm, no murmur or gallop.   2+  (HYDRODIURIL) 25 mg tablet Take 25 mg by mouth daily.  guaiFENesin ER (MUCINEX) 600 mg ER tablet Take 600 mg by mouth two (2) times daily as needed.  furosemide (LASIX) 20 mg tablet Take 20 mg by mouth daily.  ferrous sulfate 325 mg (65 mg iron) tablet Take 325 mg by mouth daily.  esomeprazole (NEXIUM) 40 mg capsule Take 40 mg by mouth daily.  ascorbic acid, vitamin C, (VITAMIN C) 500 mg tablet Take 500 mg by mouth daily.  dilTIAZem (TIAZAC) 300 mg SR capsule Take 300 mg by mouth daily.  bimatoprost (LUMIGAN) 0.01 % ophthalmic drops Administer 1 Drop to both eyes every evening.  difluprednate (DUREZOL) 0.05 % ophthalmic emulsion Administer 1 Drop to right eye daily as needed.  peg 400-propylene glycol (SYSTANE, PROPYLENE GLYCOL,) 0.4-0.3 % drop 2 Drops as needed.  metFORMIN (GLUCOPHAGE) 500 mg tablet Take 250 mg by mouth two (2) times a day.  potassium chloride SR (K-TAB) 20 mEq tablet Take 1 Tab by mouth daily. 20 Tab 0    pravastatin (PRAVACHOL) 10 mg tablet Take 10 mg by mouth daily.  valsartan (DIOVAN) 160 mg tablet Take 160 mg by mouth daily. Indications: HYPERTENSION      bromfenac (BROMDAY) 0.09 % ophthalmic solution Administer 1 Drop to both eyes two (2) times a day. Past History     Past Medical History:  Past Medical History:   Diagnosis Date    Anxiety     Asthma     no problems    Chronic lung disease     Chronic obstructive pulmonary disease ([address]     Diabetes ([address]     diet controlled/exercise    Diabetes mellitus ([address]     Glaucoma     right eye    Hypercholesteremia     Hypertension     Pulmonary nodules        Past Surgical History:  Past Surgical History:   Procedure Laterality Date    COLONOSCOPY N/A 11/27/2019    COLONOSCOPY performed by Braydon Abdi MD at [facility] END[facility]COPY    HX CATARACT REMOVAL      bilateral    HX COLONOSCOPY         Family History:  No family history on file.     Social History:  Social History radial pulses bilaterally with less than 2 second capillary refill.  Abdomen: Soft, non tender, bowel sounds present.  No appreciable hepatomegaly or splenomegaly.  No masses.   Extremities: Without deformity, clubbing, or cyanosis of upper or lower extremities.  No edema of lower extremities.  Neurologic:  Alert, verbal, appropriate.  Oriented x 3.  Speech fluent.  No apraxia or ataxia observed.         ASSESSMENT & PLAN      Acute bronchitis, unspecified organism  (primary encounter diagnosis)  Comment: Acute  Plan: COVID DIAGNOSTIC TEST,     Symptomatic control:  albuterol 108 (90 Base)         MCG/ACT inhaler,     Antibiotic regeminazithromycin (Zithromax Z-Marco)        250 MG tablet, Zpak take as directed    Eustachion Tube dysfunction:  ibuprofen (MOTRIN) 600 MG tablet, 3-4 times daily as needed.                Orders Placed This Encounter   • 2019 Novel Coronavirus (SARS-CoV-2)   • COVID DIAGNOSTIC TESTING   • albuterol 108 (90 Base) MCG/ACT inhaler   • azithromycin (Zithromax Z-Marco) 250 MG tablet   • ibuprofen (MOTRIN) 600 MG tablet       See Patient Instruction section    Return if symptoms worsen or fail to improve.   Tobacco Use    Smoking status: Former Smoker     Packs/day: 3.00     Years: 42.00     Pack years: 126.00     Types: Cigarettes     Quit date:      Years since quittin.4    Smokeless tobacco: Never Used    Tobacco comment: quit smoking in    Substance Use Topics    Alcohol use: Yes     Comment: 2 beers per day    Drug use: No       Allergies: Allergies   Allergen Reactions    Flu Vaccine Qs2014-15(36mo,Up) Nausea and Vomiting     2018 Vaccine - patient denies    Pcn [Penicillins] Hives    Vioxx [Rofecoxib] Unknown (comments)     Cannot remember reaction         Review of Systems   Review of Systems   Constitutional: Negative for chills and fever. HENT: Negative for congestion, rhinorrhea and sore throat. Respiratory: Negative for cough and shortness of breath. Cardiovascular: Negative for chest pain. Gastrointestinal: Negative for abdominal pain, blood in stool, constipation, diarrhea, nausea and vomiting. Genitourinary: Negative for dysuria, frequency and hematuria. Musculoskeletal: Negative for back pain and myalgias. Skin: Negative for rash and wound. Neurological: Negative for dizziness and headaches. All other systems reviewed and are negative. All Other Systems Negative  Physical Exam     Vitals:    21 1140 21 1214   BP: 129/86    Pulse: (!) 103 96   Resp: 20    Temp: 98.7 °F (37.1 °C)    SpO2: 97%    Weight: 89.8 kg (198 lb)    Height: 5' 9\" (1.753 m)      Physical Exam  Vitals and nursing note reviewed. Constitutional:       General: He is not in acute distress. Appearance: He is well-developed. He is not diaphoretic. HENT:      Head: Normocephalic and atraumatic. Eyes:      Conjunctiva/sclera: Conjunctivae normal.   Cardiovascular:      Rate and Rhythm: Normal rate and regular rhythm. Heart sounds: Normal heart sounds. Pulmonary:      Effort: Pulmonary effort is normal. No respiratory distress.       Breath sounds: Normal breath sounds. Chest:      Chest wall: No tenderness. Abdominal:      General: Bowel sounds are normal. There is no distension. Palpations: Abdomen is soft. Tenderness: There is no abdominal tenderness. There is no guarding or rebound. Musculoskeletal:         General: No deformity. Normal range of motion. Cervical back: Normal range of motion and neck supple. Right knee: No swelling, deformity, effusion, bony tenderness or crepitus. No tenderness. Normal alignment. Comments: Well healed incision without secondary signs of infection noted to the right knee     Skin:     General: Skin is warm and dry. Neurological:      General: No focal deficit present. Mental Status: He is oriented to person, place, and time. GCS: GCS eye subscore is 4. GCS verbal subscore is 5. GCS motor subscore is 6. Cranial Nerves: Cranial nerves are intact. Sensory: Sensation is intact. Motor: Motor function is intact. No weakness or pronator drift. Coordination: Coordination is intact. Romberg sign negative. Gait: Gait is intact. Gait normal.      Comments: Ambulates across the room without difficulties or assistance            Diagnostic Study Results     Labs -   No results found for this or any previous visit (from the past 12 hour(s)). Radiologic Studies -   No orders to display     CT Results  (Last 48 hours)    None        CXR Results  (Last 48 hours)    None            Medical Decision Making   I am the first provider for this patient. I reviewed the vital signs, available nursing notes, past medical history, past surgical history, family history and social history. Vital Signs-Reviewed the patient's vital signs.       Records Reviewed: Nursing Notes and Old Medical Records     Procedures: None   Procedures    Provider Notes (Medical Decision Making):     Differential: fracture, dislocation, abrasion, sprain, contusion, laceration, closed head injury    Plan: Have discussed reassuring physical exam and neuro exam with patient and wife. Patient states he feels well and has no complaints at this time, do not feel emergent imaging is needed at this time. Do not feel emergent work-up is needed either. Have strongly encourage patient to continue using his cane and walker when ambulating around the house. Have advised patient to follow-up closely with Ortho, his wife states they will call today for follow-up. Patient and wife are agreeable to this plan. have given strict return precautions. Will discharge home. MED RECONCILIATION:  No current facility-administered medications for this encounter. Current Outpatient Medications   Medication Sig    budesonide-formoteroL (Symbicort) 160-4.5 mcg/actuation HFAA Take 2 Puffs by inhalation two (2) times a day.  albuterol (PROVENTIL HFA, VENTOLIN HFA, PROAIR HFA) 90 mcg/actuation inhaler Take 2 Puffs by inhalation every four (4) hours as needed for Shortness of Breath.  timolol (TIMOPTIC) 0.5 % ophthalmic solution instill 1 drop into both eyes once daily    hydroCHLOROthiazide (HYDRODIURIL) 25 mg tablet Take 25 mg by mouth daily.  guaiFENesin ER (MUCINEX) 600 mg ER tablet Take 600 mg by mouth two (2) times daily as needed.  furosemide (LASIX) 20 mg tablet Take 20 mg by mouth daily.  ferrous sulfate 325 mg (65 mg iron) tablet Take 325 mg by mouth daily.  esomeprazole (NEXIUM) 40 mg capsule Take 40 mg by mouth daily.  ascorbic acid, vitamin C, (VITAMIN C) 500 mg tablet Take 500 mg by mouth daily.  dilTIAZem (TIAZAC) 300 mg SR capsule Take 300 mg by mouth daily.  bimatoprost (LUMIGAN) 0.01 % ophthalmic drops Administer 1 Drop to both eyes every evening.  difluprednate (DUREZOL) 0.05 % ophthalmic emulsion Administer 1 Drop to right eye daily as needed.  peg 400-propylene glycol (SYSTANE, PROPYLENE GLYCOL,) 0.4-0.3 % drop 2 Drops as needed.     metFORMIN (GLUCOPHAGE) 500 mg tablet Take 250 mg by mouth two (2) times a day.  potassium chloride SR (K-TAB) 20 mEq tablet Take 1 Tab by mouth daily.  pravastatin (PRAVACHOL) 10 mg tablet Take 10 mg by mouth daily.  valsartan (DIOVAN) 160 mg tablet Take 160 mg by mouth daily. Indications: HYPERTENSION    bromfenac (BROMDAY) 0.09 % ophthalmic solution Administer 1 Drop to both eyes two (2) times a day. Disposition:  Home     DISCHARGE NOTE:   Pt has been reexamined. Patient has no new complaints, changes, or physical findings. Care plan outlined and precautions discussed. Results of workup were reviewed with the patient. All medications were reviewed with the patient. All of pt's questions and concerns were addressed. Patient was instructed and agrees to follow up with Ortho as well as to return to the ED upon further deterioration. Patient is ready to go home. Follow-up Information     Follow up With Specialties Details Why Contact Info    [id number] [facility] EMERGENCY DEPT Emergency Medicine  As needed [address]  [phone]    Follow-up in 1-2 days with Ortho              Current Discharge Medication List              Diagnosis     Clinical Impression:   1. Fall, initial encounter          \"Please note that this dictation was completed with [employer], the computer voice recognition software. Quite often unanticipated grammatical, syntax, homophones, and other interpretive errors are inadvertently transcribed by the computer software. Please disregard these errors. Please excuse any errors that have escaped final proofreading. \"

## 2021-11-04 NOTE — TELEPHONE ENCOUNTER
Patient requesting refill of Symbicort on 11/4/2021. Last office visit: 9/14/2021  Follow up Visit: Due March 2022    Provider is aware of last office visit and follow up. No further action requested from provider.

## 2021-11-04 NOTE — TELEPHONE ENCOUNTER
Pt [id number]([phone]). Needs refill for Symbicort sent to [facility] ([phone]).

## 2021-11-05 RX ORDER — BUDESONIDE AND FORMOTEROL FUMARATE DIHYDRATE 160; 4.5 UG/1; UG/1
2 AEROSOL RESPIRATORY (INHALATION) 2 TIMES DAILY
Qty: 10.2 G | Refills: 5 | Status: SHIPPED | OUTPATIENT
Start: 2021-11-05

## 2022-03-04 ENCOUNTER — DOCUMENTATION ONLY (OUTPATIENT)
Dept: PULMONOLOGY | Age: 82
End: 2022-03-04

## 2022-03-18 PROBLEM — M17.11 OSTEOARTHRITIS OF RIGHT KNEE: Status: ACTIVE | Noted: 2021-03-03

## 2022-03-19 PROBLEM — R00.0 TACHYCARDIA: Status: ACTIVE | Noted: 2019-05-23

## 2022-03-19 PROBLEM — H25.9 AGE-RELATED CATARACT OF BOTH EYES: Status: ACTIVE | Noted: 2019-05-23

## 2022-03-20 PROBLEM — D50.9 IRON DEFICIENCY ANEMIA: Status: ACTIVE | Noted: 2019-05-23

## 2022-11-25 NOTE — TELEPHONE ENCOUNTER
Pt calling office requesting refill of symbicort be sent to [facility] on [facility]. Pt has not been seen in over a year. Tried to make an appt w/LZ but pt refused.  Please advise [phone]

## 2022-11-25 NOTE — TELEPHONE ENCOUNTER
Called patient, no answer, number no longer in service, unable to leave message requesting a return call. Unfortunately since patient has not been seen in office in over year, we are unable to refill symbicort. Patient will need to schedule a follow up with Dr. Jo Ann Quiñones, before we can refill. After appt is made we can refill enough to get patient to appt.

## 2022-11-29 RX ORDER — BUDESONIDE AND FORMOTEROL FUMARATE DIHYDRATE 160; 4.5 UG/1; UG/1
2 AEROSOL RESPIRATORY (INHALATION) 2 TIMES DAILY
Qty: 1 EACH | Refills: 1 | Status: SHIPPED | OUTPATIENT
Start: 2022-11-29

## 2022-11-29 NOTE — TELEPHONE ENCOUNTER
Pt calling office today re refill. Informed pt of message from nurse. Pt made a follow up for 1/23/23. Can the refill be sent in now? He is asking for a nurse to call him back today.  Please call [phone]

## 2023-01-23 ENCOUNTER — OFFICE VISIT (OUTPATIENT)
Dept: PULMONOLOGY | Age: 83
End: 2023-01-23
Payer: MEDICARE

## 2023-01-23 VITALS
HEART RATE: 86 BPM | HEIGHT: 69 IN | OXYGEN SATURATION: 97 % | RESPIRATION RATE: 16 BRPM | SYSTOLIC BLOOD PRESSURE: 145 MMHG | BODY MASS INDEX: 28.02 KG/M2 | TEMPERATURE: 98.6 F | WEIGHT: 189.2 LBS | DIASTOLIC BLOOD PRESSURE: 85 MMHG

## 2023-01-23 DIAGNOSIS — J44.9 CHRONIC OBSTRUCTIVE PULMONARY DISEASE, UNSPECIFIED COPD TYPE (HCC): ICD-10-CM

## 2023-01-23 DIAGNOSIS — Z87.891 FORMER SMOKER: ICD-10-CM

## 2023-01-23 DIAGNOSIS — R91.1 LUNG NODULE: Primary | ICD-10-CM

## 2023-01-23 PROCEDURE — G8536 NO DOC ELDER MAL SCRN: HCPCS | Performed by: INTERNAL MEDICINE

## 2023-01-23 PROCEDURE — G8427 DOCREV CUR MEDS BY ELIG CLIN: HCPCS | Performed by: INTERNAL MEDICINE

## 2023-01-23 PROCEDURE — 3079F DIAST BP 80-89 MM HG: CPT | Performed by: INTERNAL MEDICINE

## 2023-01-23 PROCEDURE — 1101F PT FALLS ASSESS-DOCD LE1/YR: CPT | Performed by: INTERNAL MEDICINE

## 2023-01-23 PROCEDURE — 3077F SYST BP >= 140 MM HG: CPT | Performed by: INTERNAL MEDICINE

## 2023-01-23 PROCEDURE — G8432 DEP SCR NOT DOC, RNG: HCPCS | Performed by: INTERNAL MEDICINE

## 2023-01-23 PROCEDURE — 1123F ACP DISCUSS/DSCN MKR DOCD: CPT | Performed by: INTERNAL MEDICINE

## 2023-01-23 PROCEDURE — G8417 CALC BMI ABV UP PARAM F/U: HCPCS | Performed by: INTERNAL MEDICINE

## 2023-01-23 PROCEDURE — 99214 OFFICE O/P EST MOD 30 MIN: CPT | Performed by: INTERNAL MEDICINE

## 2023-01-23 RX ORDER — BUDESONIDE AND FORMOTEROL FUMARATE DIHYDRATE 160; 4.5 UG/1; UG/1
2 AEROSOL RESPIRATORY (INHALATION) 2 TIMES DAILY
Qty: 1 EACH | Refills: 5 | Status: SHIPPED | OUTPATIENT
Start: 2023-01-23

## 2023-01-23 NOTE — PROGRESS NOTES
HISTORY OF PRESENT ILLNESS  Roseann Goyal is a 81 y.o. male following up for COPD and lung nodules. Former smoker with COPD last FEV1 50% in 2019. Symptoms are controlled on Symbicort with minimal use of rescue Albuterol. No interval ED or hospital admissions for COPD. No chest pain, chronic cough, fever or hemoptysis. No new respiratory symptoms. Pt with baseline SOB on moderate exertion although states ambulation is limited more by musculoskeletal issues. Review of Systems   Constitutional:  Negative for chills, diaphoresis, fever, malaise/fatigue and weight loss. HENT:  Negative for congestion, ear discharge, ear pain, hearing loss, nosebleeds, sinus pain, sore throat and tinnitus. Eyes:  Negative for blurred vision, double vision, photophobia, pain, discharge and redness. Respiratory:  Negative for cough, hemoptysis, sputum production, wheezing and stridor. Shortness of breath: with exertion. Cardiovascular:  Positive for leg swelling. Negative for chest pain, palpitations, orthopnea, claudication and PND. Gastrointestinal:  Negative for abdominal pain, blood in stool, constipation, diarrhea, heartburn, melena, nausea and vomiting. Genitourinary:  Negative for dysuria, flank pain, frequency, hematuria and urgency. Musculoskeletal:  Positive for back pain and joint pain. Negative for falls, myalgias and neck pain. Skin:  Negative for itching and rash. Neurological:  Negative for dizziness, tingling, tremors, sensory change, speech change, focal weakness, seizures, loss of consciousness, weakness and headaches. Endo/Heme/Allergies:  Negative for environmental allergies and polydipsia. Does not bruise/bleed easily. Psychiatric/Behavioral:  Negative for depression, hallucinations, memory loss, substance abuse and suicidal ideas. The patient is not nervous/anxious and does not have insomnia.     Past Medical History:   Diagnosis Date    Adrenal nodule (HCC)     Anxiety     Asthma     no problems    BPH (benign prostatic hyperplasia)     Chronic lung disease     Chronic obstructive pulmonary disease (HCC)     Diabetes ([facility] [address]     diet controlled/exercise    Diabetes mellitus ([facility] [address]     Glaucoma     right eye    Hypercholesteremia     Hypertension     Pulmonary nodules     Pyelonephritis      Past Surgical History:   Procedure Laterality Date    COLONOSCOPY N/A 11/27/2019    COLONOSCOPY performed by Sangita Wilder MD at [facility]    HX CATARACT REMOVAL      bilateral    HX COLONOSCOPY      HX KNEE REPLACEMENT       Current Outpatient Medications on File Prior to Visit   Medication Sig Dispense Refill    fluorometholone (FML) 0.1 % ophthalmic suspension instill 1 drop into right eye twice a day      prednisoLONE acetate (PRED FORTE) 1 % ophthalmic suspension instill 1 drop into right eye twice a day      spironolactone (ALDACTONE) 25 mg tablet       tamsulosin (FLOMAX) 0.4 mg capsule Take 1 Capsule by mouth daily (after dinner). 90 Capsule 3    calcium polycarbophiL (FIBERCON) 625 mg tablet Take 1 Tablet by mouth daily. loxapine (LOXITANE) 10 mg capsule Take 10 mg by mouth. senna-docusate (PERICOLACE) 8.6-50 mg per tablet Take 1 Tablet by mouth daily. albuterol (PROVENTIL HFA, VENTOLIN HFA, PROAIR HFA) 90 mcg/actuation inhaler Take 2 Puffs by inhalation every four (4) hours as needed for Shortness of Breath. 1 Inhaler 3    hydroCHLOROthiazide (HYDRODIURIL) 25 mg tablet Take 25 mg by mouth daily. furosemide (LASIX) 20 mg tablet Take 20 mg by mouth daily. ferrous sulfate 325 mg (65 mg iron) tablet Take 325 mg by mouth daily. esomeprazole (NEXIUM) 40 mg capsule Take 40 mg by mouth daily. ascorbic acid, vitamin C, (VITAMIN C) 500 mg tablet Take 500 mg by mouth daily. dilTIAZem (TIAZAC) 300 mg SR capsule Take 300 mg by mouth daily. bimatoprost (LUMIGAN) 0.01 % ophthalmic drops Administer 1 Drop to both eyes every evening.       difluprednate (DUREZOL) 0.05 % ophthalmic emulsion Administer 1 Drop to right eye daily as needed. peg 400-propylene glycol (SYSTANE) 0.4-0.3 % drop 2 Drops as needed. metFORMIN (GLUCOPHAGE) 500 mg tablet Take 250 mg by mouth two (2) times a day. potassium chloride SR (K-TAB) 20 mEq tablet Take 1 Tab by mouth daily. 20 Tab 0    pravastatin (PRAVACHOL) 10 mg tablet Take 10 mg by mouth daily. valsartan (DIOVAN) 160 mg tablet Take 160 mg by mouth daily. Indications: HYPERTENSION      bromfenac (BROMDAY) 0.09 % ophthalmic solution Administer 1 Drop to both eyes two (2) times a day. timolol (TIMOPTIC) 0.5 % ophthalmic solution instill 1 drop into both eyes once daily (Patient not taking: Reported on 2023)       No current facility-administered medications on file prior to visit. Allergies   Allergen Reactions    Penicillins Hives, Anaphylaxis, Itching and Swelling     Shock     Flu Vaccine Qs2014-15(36mo,Up) Nausea and Vomiting      Vaccine - patient denies    Rofecoxib Unknown (comments)     Cannot remember reaction  Other reaction(s): unknown  \"I CAN'T REMEMBER\"     History reviewed. No pertinent family history.   Social History     Socioeconomic History    Marital status:      Spouse name: Not on file    Number of children: Not on file    Years of education: Not on file    Highest education level: Not on file   Occupational History    Not on file   Tobacco Use    Smoking status: Former     Packs/day: 3.00     Years: 42.00     Pack years: 126.00     Types: Cigarettes     Quit date:      Years since quittin.0    Smokeless tobacco: Never    Tobacco comments:     quit smoking in    Substance and Sexual Activity    Alcohol use: Yes     Comment: 2 beers per day    Drug use: No    Sexual activity: Not on file   Other Topics Concern    Not on file   Social History Narrative    Not on file     Social Determinants of Health     Financial Resource Strain: Not on file   Food Insecurity: Not on file Transportation Needs: Not on file   Physical Activity: Not on file   Stress: Not on file   Social Connections: Not on file   Intimate Partner Violence: Not on file   Housing Stability: Not on file     Blood pressure (!) 146/85, pulse 86, temperature 98.6 °F (37 °C), temperature source Temporal, resp. rate 16, height 5' 9\" (1.753 m), weight 85.8 kg (189 lb 3.2 oz), SpO2 97 %. Physical Exam  Constitutional:       General: He is not in acute distress. Appearance: He is not ill-appearing, toxic-appearing or diaphoretic. Comments: Uses a cane   HENT:      Head: Normocephalic and atraumatic. Right Ear: External ear normal.      Left Ear: External ear normal.      Nose: Nose normal. No congestion. Mouth/Throat:      Mouth: Mucous membranes are moist.      Pharynx: No oropharyngeal exudate. Eyes:      General: No scleral icterus. Right eye: No discharge. Left eye: No discharge. Extraocular Movements: Extraocular movements intact. Conjunctiva/sclera: Conjunctivae normal.      Pupils: Pupils are equal, round, and reactive to light. Neck:      Vascular: No carotid bruit. Cardiovascular:      Rate and Rhythm: Normal rate and regular rhythm. Pulses: Normal pulses. Heart sounds: Normal heart sounds. No murmur heard. No gallop. Pulmonary:      Effort: Pulmonary effort is normal. No respiratory distress. Breath sounds: No stridor. No wheezing, rhonchi or rales. Comments: Distant breath sounds  Chest:      Chest wall: No tenderness. Abdominal:      Palpations: Abdomen is soft. There is no mass. Tenderness: There is no abdominal tenderness. Comments: Large ventral hernia   Musculoskeletal:         General: No swelling, deformity or signs of injury. Cervical back: No rigidity or tenderness. Right lower leg: Right lower leg edema: 1+. Left lower leg: Left lower leg edema: 1+.    Lymphadenopathy:      Cervical: No cervical adenopathy. Skin:     General: Skin is warm and dry. Coloration: Skin is not jaundiced or pale. Findings: No bruising, erythema, lesion or rash. Neurological:      Mental Status: He is alert and oriented to person, place, and time. Coordination: Coordination normal.      Gait: Abnormal gait: antalgic. Psychiatric:         Mood and Affect: Mood normal.         Behavior: Behavior normal.         Thought Content: Thought content normal.         Judgment: Judgment normal.     CT Results (most recent):  Results from Abstract encounter on 10/25/22    CT ABD PELV WO CONT    Impression  [address] CAT SCAN  [facility] [id number]  [phone]    Imaging Result    Name:  Vanessa Flowers ([id number])  Sex: Male :  1940 Ordering Provider: Sofy BELL Provider:    Diagnosis:    RUQ pain x 4 days  Procedures Performed:  CT ABD/PELVIS-NO ORAL/IV  [id number] Exam Date/Time:  2022  9:32 AM      EXAM: CT ABDOMEN AND PELVIS    CLINICAL INDICATION/HISTORY:  RUQ pain x 4 days.  -Additional: None    COMPARISON: CT 2009    TECHNIQUE: Axial CT imaging of the abdomen and pelvis was performed without intravenous contrast. Multiplanar reformats were generated. One or more dose reduction techniques were used on this CT: automated exposure control, adjustment of the mAs and/or kVp according to patient size, and iterative reconstruction techniques. The specific techniques used on this CT exam have been documented in the patient's electronic medical record. Digital Imaging and Communications in Medicine (DICOM) format image data are available to nonaffiliated external healthcare facilities or entities on a secure, media free, reciprocally searchable basis with patient authorization for at least a 12-month period after this study.     _______________    FINDINGS:    LIMITATIONS:  > Lack of intravenous contrast limits the assessment of solid organs and vasculature. LOWER CHEST: Unremarkable. LIVER: Indeterminant 2.6 cm low-attenuation right liver lesion (axial image 34, coronal image 70). Punctate calcifications likely sequela of granulomatous disease. BILIARY: The gallbladder appears normal. No biliary dilation. SPLEEN: Normal.    PANCREAS: Normal.    ADRENALS: Unchanged 2 cm left adrenal nodule, likely lipid rich adenoma. Normal right adrenal gland. KIDNEYS/URETERS: Benign-appearing fluid density cysts (No dedicated follow-up imaging is recommended as incidental lesions are likely benign). No radiopaque stone. No hydronephrosis. BLADDER: Bladder diverticula (axial image 101, sagittal image 68). Mild wall thickening throughout the partially distended bladder. GASTROINTESTINAL TRACT:  > Limited evaluation of the stomach, grossly normal.  > No bowel dilation or evidence of obstruction. > Colonic diverticulosis is present, without evidence of acute inflammation. Normal appendix. VASCULATURE: Moderate burden of atherosclerotic plaque, especially around the main abdominal ostia. LYMPH NODES: No enlarged lymph nodes. PELVIC ORGANS: Enlarged prostate. OTHER: None. BONES: Anterior wedge-shaped L2 compression deformity with superior endplate Schmorl's node (sagittal image 65), new since 11/19/2009; likely chronic in the absence of recent trauma. Multilevel degenerative changes most pronounced in the lumbar spine.    _______________    IMPRESSION    1. Indeterminant 2.6 cm right liver lesion. Recommend nonemergent contrast-enhanced liver protocol CT or MRI for further evaluation. 2.         Bladder wall thickening may be due to underdistention and/or infection; correlate for cystitis. 3.         Bladder diverticula suggests chronic outlet obstruction likely secondary to prostatomegaly  4. Osseous degenerative changes and additional findings as detailed in the body of the report.     Signed By: Jerzy Timmons MD on 7/7/2022 9:53 AM        Dictated by: Josseline Joseph on Thu Jul 7, 2022  9:39:02 AM EDT  Signed By:Mario Conner MD [facility]  7/7/2022  9:53 AM      [facility] [[id number]]        ~~This report was copied and pasted from the servicing EHR system. ~~      ASSESSMENT and PLAN  Encounter Diagnoses   Name Primary? Lung nodule Yes    Chronic obstructive pulmonary disease, unspecified COPD type ([facility] [address]     Former smoker        Pt with COPD, stable symptoms on Symbicort. Rx refilled. Will obtain PFTs on next visit and monitor symptoms for progression and consideration for triple therapy. Otherwise would continue current LABA/ICS and prn Albuterol  Nodule is stable almost 2 years. Schedule repeat CT ASAP. If remains stable, no further CT follow up indicated. Advised flu vaccine with PCP.   RTC 6 months, spirometry

## 2023-01-23 NOTE — PROGRESS NOTES
Priscila Low presents today for   Chief Complaint   Patient presents with    COPD    Follow-up       Is someone accompanying this pt? No    Is the patient using any DME equipment during OV? No    -DME Company NA    Depression Screening:  3 most recent PHQ Screens 7/20/2020   Little interest or pleasure in doing things Not at all   Feeling down, depressed, irritable, or hopeless Not at all   Total Score PHQ 2 0       Learning Assessment:  Learning Assessment 9/17/2019   PRIMARY LEARNER Patient   HIGHEST LEVEL OF EDUCATION - PRIMARY LEARNER  > 4 YEARS OF COLLEGE   BARRIERS PRIMARY LEARNER NONE   CO-LEARNER CAREGIVER No   CO-LEARNER NAME No   CO-LEARNER HIGHEST LEVEL OF EDUCATION > 4 YEARS OF COLLEGE   BARRIERS CO-LEARNER NONE   PRIMARY LANGUAGE ENGLISH    NEED No   LEARNER PREFERENCE PRIMARY DEMONSTRATION   ANSWERED BY Pt   RELATIONSHIP SELF       Abuse Screening:  No flowsheet data found. Fall Risk  Fall Risk Assessment, last 12 mths 7/20/2020   Able to walk? Yes   Fall in past 12 months? No   Number of falls in past 12 months -   Fall with injury? -         Coordination of Care:  1. Have you been to the ER, urgent care clinic since your last visit? Hospitalized since your last visit? No    2. Have you seen or consulted any other health care providers outside of the [address] [place] [address] since your last visit? Include any pap smears or colon screening.  PCP                2

## 2023-02-03 ENCOUNTER — HOSPITAL ENCOUNTER (OUTPATIENT)
Dept: CT IMAGING | Age: 83
Discharge: HOME | End: 2023-02-03
Attending: INTERNAL MEDICINE
Payer: MEDICARE

## 2023-02-03 DIAGNOSIS — R91.1 LUNG NODULE: ICD-10-CM

## 2023-02-03 PROCEDURE — 71250 CT THORAX DX C-: CPT

## 2023-04-18 RX ORDER — BUDESONIDE AND FORMOTEROL FUMARATE DIHYDRATE 160; 4.5 UG/1; UG/1
2 AEROSOL RESPIRATORY (INHALATION) 2 TIMES DAILY
Qty: 10.2 G | Refills: 5 | Status: SHIPPED | OUTPATIENT
Start: 2023-04-18

## 2023-04-18 NOTE — TELEPHONE ENCOUNTER
Pt states he called [facility] for refills on Symbicort and they stated he didn't have any refills. Please call [facility] and then let pt know.

## 2023-08-10 ENCOUNTER — OFFICE VISIT (OUTPATIENT)
Age: 83
End: 2023-08-10
Payer: COMMERCIAL

## 2023-08-10 VITALS
SYSTOLIC BLOOD PRESSURE: 152 MMHG | TEMPERATURE: 97.2 F | WEIGHT: 199 LBS | OXYGEN SATURATION: 96 % | HEART RATE: 91 BPM | BODY MASS INDEX: 29.47 KG/M2 | HEIGHT: 69 IN | RESPIRATION RATE: 16 BRPM | DIASTOLIC BLOOD PRESSURE: 86 MMHG

## 2023-08-10 DIAGNOSIS — R91.8 LUNG NODULES: ICD-10-CM

## 2023-08-10 DIAGNOSIS — Z87.891 FORMER SMOKER: ICD-10-CM

## 2023-08-10 DIAGNOSIS — J44.9 CHRONIC OBSTRUCTIVE PULMONARY DISEASE, UNSPECIFIED COPD TYPE (HCC): Primary | ICD-10-CM

## 2023-08-10 DIAGNOSIS — C22.0 HEPATOCELLULAR CARCINOMA (HCC): ICD-10-CM

## 2023-08-10 PROCEDURE — 1123F ACP DISCUSS/DSCN MKR DOCD: CPT | Performed by: INTERNAL MEDICINE

## 2023-08-10 PROCEDURE — 3079F DIAST BP 80-89 MM HG: CPT | Performed by: INTERNAL MEDICINE

## 2023-08-10 PROCEDURE — 94060 EVALUATION OF WHEEZING: CPT | Performed by: INTERNAL MEDICINE

## 2023-08-10 PROCEDURE — 3077F SYST BP >= 140 MM HG: CPT | Performed by: INTERNAL MEDICINE

## 2023-08-10 PROCEDURE — 99214 OFFICE O/P EST MOD 30 MIN: CPT | Performed by: INTERNAL MEDICINE

## 2023-08-10 RX ORDER — BUDESONIDE AND FORMOTEROL FUMARATE DIHYDRATE 160; 4.5 UG/1; UG/1
2 AEROSOL RESPIRATORY (INHALATION) 2 TIMES DAILY
Qty: 10.2 G | Refills: 5 | Status: SHIPPED | OUTPATIENT
Start: 2023-08-10

## 2023-08-10 RX ORDER — ALBUTEROL SULFATE 90 UG/1
2 AEROSOL, METERED RESPIRATORY (INHALATION) EVERY 4 HOURS PRN
Qty: 18 G | Refills: 5 | Status: SHIPPED | OUTPATIENT
Start: 2023-08-10

## 2023-08-10 ASSESSMENT — ENCOUNTER SYMPTOMS
VOICE CHANGE: 0
EYE PAIN: 0
CONSTIPATION: 0
COUGH: 0
SORE THROAT: 0
STRIDOR: 0
CHOKING: 0
VOMITING: 0
BACK PAIN: 1
ABDOMINAL PAIN: 0
EYE DISCHARGE: 0
CHEST TIGHTNESS: 0
EYE ITCHING: 0
BLOOD IN STOOL: 0
EYE REDNESS: 0
NAUSEA: 0
SHORTNESS OF BREATH: 0
APNEA: 0
COLOR CHANGE: 0
DIARRHEA: 0
TROUBLE SWALLOWING: 0
WHEEZING: 0
PHOTOPHOBIA: 0

## 2023-12-26 ENCOUNTER — TELEPHONE (OUTPATIENT)
Age: 83
End: 2023-12-26

## 2023-12-26 RX ORDER — BUDESONIDE AND FORMOTEROL FUMARATE DIHYDRATE 160; 4.5 UG/1; UG/1
2 AEROSOL RESPIRATORY (INHALATION) 2 TIMES DAILY
Qty: 10.2 G | Refills: 5 | Status: SHIPPED | OUTPATIENT
Start: 2023-12-26

## 2023-12-26 NOTE — TELEPHONE ENCOUNTER
Shana Davis/patient spouse called stating that [facility] have not been able to provide refills on the Symbicort dt being out of stock and the pharmacy will be closing down in the near future. Pt will call [facility] and [facility] to see if they have Symbicort in stock; then call our office back to update patients phamarcy in our records. Mrs Shantell Morrison. r/spouse stated they have been waiting a week for the medication. If neither pharmacy have the medication in stock, patient would like to come into office today and pickup a hardcopy of the Rx for Symbicort.    Patient contact#  [phone]

## 2023-12-26 NOTE — TELEPHONE ENCOUNTER
Patient spouse called back stating that the [facility] verified having Symbicort in stock. Please transfer present Rx or submit a new Rx for Symbicort.     Pharmacy:  [facility] [address]

## 2023-12-27 NOTE — PROGRESS NOTES
[facility] Pulmonary, Critical Care, and Sleep Medicine Chief complaint: 
Pulmonary nodule and shortness of breath HPI: 
Gerson Quezada is 69years old and was referred by the emergency room to the office because of a pulmonary nodule noted on x-ray and also because of shortness of breath. The patient relates she has had shortness of breath for about 7 years and it is relatively stable. He states it is episodic in a coma can come on with activity such as long walks or with activity such as sitting around. He says he does not have a chronic cough denies hemoptysis chest pain leg swelling and says he sleeps well with not much daytime sleepiness. He does use albuterol occasionally. He denies allergies but has gastroesophageal reflux and states he takes medicine which helps Allergies Allergen Reactions  Flu Vaccine Qs2014-15(36mo,Up) Nausea and Vomiting 2018 Vaccine  Pcn [Penicillins] Unknown (comments)  Vioxx [Rofecoxib] Unknown (comments) Current Outpatient Medications Medication Sig  budesonide-formoterol (SYMBICORT) 160-4.5 mcg/actuation HFAA Take 2 Puffs by inhalation two (2) times a day.  potassium chloride SR (K-TAB) 20 mEq tablet Take 1 Tab by mouth daily.  ranitidine hcl 300 mg cap Take 300 mg by mouth nightly.  pravastatin (PRAVACHOL) 10 mg tablet Take 10 mg by mouth daily.  trihexyphenidyl (ARTANE) 5 mg tablet Take 10 mg by mouth daily. Indications: anxiety  valsartan (DIOVAN) 160 mg tablet Take 160 mg by mouth daily. Indications: HYPERTENSION  travoprost (TRAVATAN Z) 0.004 % ophthalmic solution Administer 1 Drop to both eyes every evening.  bromfenac (BROMDAY) 0.09 % ophthalmic solution Administer 1 Drop to both eyes two (2) times a day.  predniSONE (STERAPRED) 5 mg dose pack As directed No current facility-administered medications for this visit. Past Medical History:  
Diagnosis Date  Anxiety  Asthma Telemetry Shift Summary    Rhythm SR  HR Range 88-95  Ectopy : rPVC/rPAC  Measurements 0.12/0.08/0.36    Normal Values  Rhythm SR  HR Range:   Measurements: 0.12-0.20/0.06-0.10/0.30-0.52    no problems  Chronic lung disease  Diabetes ([facility] [address] diet controlled/exercise  Diabetes mellitus ([facility] [address]  Hypercholesteremia  Hypertension Past Surgical History:  
Procedure Laterality Date  HX CATARACT REMOVAL    
 bilateral  
 HX COLONOSCOPY Family history: Noncontributory Social History: Retired [employer]  and also [employer] Smoking history includes smoking for 42 years and no smoking for 17 Review of systems: 
He denies syncope focal muscle weakness in trouble hearing trouble seeing trouble swallowing chronic abdominal pain melena or blood in his stools dysuria hematuria rashes significant arthritis but has had problems with balance Physical Exam: 
Visit Vitals /80 (BP 1 Location: Left arm, BP Patient Position: At rest) Pulse 73 Temp 98.1 °F (36.7 °C) (Oral) Resp 21 Ht 5' 9\" (1.753 m) Wt 89.8 kg (198 lb) SpO2 97% BMI 29.24 kg/m² Well-developed well-nourished HEENT: pupils equal, reactive, sclera, non-icteric Oropharynx tongue: normal  
Neck: Supple Lymph Nodes: Supra clavicular and cervical nodes, negative Chest: Equal symmetrical expansion, no dullness, no rales or rubs but occasional right-sided expiratory wheezes and rhonchi which clear with deep breathing and cough Heart: Regular, rhythm without tee or murmur no carotid bruits Abdomen: soft, non-tender no masses or organomegaly Extremities: no cyanosis, clubbing, 1+ ankle edema bilateral and no calf tenderness Musculoskeletal: No acute joint inflammation or muscle wasting Skin: No rash Neurological: alert, oriented, moves all extremities LABS: 
O2 sat 97% room air at rest 
Spirometry 2/18/19 moderate obstructive lung defect with mild improvement with bronchodilator Chest x-ray 1/16/19 and 12/26/18 personally reviewed with small ill-defined left upper lobe nodule Impression: By history and physical exam and spirometry all suggest COPD with reactive airway disease 2 chest x-ray showing similar ill-defined left upper lobe nodule requiring further evaluation and high risk individual 
Plan: 
Obtain CT of the chest to further evaluate pulmonary nodule Start L MARLYS/ICS Follow-up in 1 month [facility] 
 
CC: Other, MD Ninfa 
 
[address], [address] [address]     P: [phone]     F: [phone]

## 2024-04-03 ENCOUNTER — TELEPHONE (OUTPATIENT)
Age: 84
End: 2024-04-03

## 2024-04-03 RX ORDER — FLUTICASONE PROPIONATE AND SALMETEROL 250; 50 UG/1; UG/1
1 POWDER RESPIRATORY (INHALATION) EVERY 12 HOURS
Qty: 60 EACH | Refills: 5 | Status: SHIPPED | OUTPATIENT
Start: 2024-04-03

## 2024-04-03 NOTE — TELEPHONE ENCOUNTER
Symbicort no longer covered medication. Wixela 250/50 or Breo Ellipta 100/25 preferred. [facility] Fax [phone]

## 2024-04-03 NOTE — TELEPHONE ENCOUNTER
Pt wife called in stating that prescription is still not at pharm and the new prescription needs to be authorized... per pharmacy. Pt wife states they just left pharm and they gave them a update and to contact office... pt wife number is [phone]

## 2024-04-03 NOTE — TELEPHONE ENCOUNTER
Pt called in stating he's having difficulty picking up his meds from pharm. Per pt, yogesh said they had no record of the prescription being sent to them. Pls call pt at [phone]

## 2024-05-02 RX ORDER — BUDESONIDE AND FORMOTEROL FUMARATE DIHYDRATE 160; 4.5 UG/1; UG/1
2 AEROSOL RESPIRATORY (INHALATION) 2 TIMES DAILY
Qty: 3 G | Refills: 3 | Status: SHIPPED | OUTPATIENT
Start: 2024-05-02

## 2024-05-02 NOTE — TELEPHONE ENCOUNTER
Pt stated that he would like to speak with nurse in [facility] Please call [phone]

## 2024-05-02 NOTE — TELEPHONE ENCOUNTER
[facility] that was denied on his insurance. [facility] would [facility] and not [facility] the Wixela

## 2024-05-06 ENCOUNTER — TELEPHONE (OUTPATIENT)
Age: 84
End: 2024-05-06

## 2024-05-06 NOTE — TELEPHONE ENCOUNTER
Called and talked to wife. Patient having some SOB. Patient states it is not bad. Explained he needs to go to ED if SOB worse. They understand and are agreeable.

## 2024-05-08 ENCOUNTER — TELEPHONE (OUTPATIENT)
Age: 84
End: 2024-05-08

## 2024-05-08 NOTE — TELEPHONE ENCOUNTER
Patient's wife called back. [facility] and will use the Wixela that is at the pharmacy. He has filled and brought into office at visit and was shown how to use it.

## 2024-05-08 NOTE — TELEPHONE ENCOUNTER
Left message  (Symbicort was denied to local pharmacy the end of April. Wixela that was covered was sent by Dr. Adkins to replace. [facility] felt [facility]. If they don't have it the PA is not going to work at local pharmacy, he has to use the one on formulary first and there are several to go though, Advair Diskus, HFA, Dulera and also Breo before Symbicort)

## 2024-06-03 ENCOUNTER — HOSPITAL ENCOUNTER (INPATIENT)
Facility: HOSPITAL | Age: 84
LOS: 1 days | Discharge: HOME | DRG: 071 | End: 2024-06-05
Attending: INTERNAL MEDICINE | Admitting: INTERNAL MEDICINE
Payer: MEDICARE

## 2024-06-03 ENCOUNTER — APPOINTMENT (OUTPATIENT)
Facility: HOSPITAL | Age: 84
DRG: 071 | End: 2024-06-03
Payer: MEDICARE

## 2024-06-03 DIAGNOSIS — N17.9 AKI (ACUTE KIDNEY INJURY) (HCC): Primary | ICD-10-CM

## 2024-06-03 LAB
ALBUMIN SERPL-MCNC: 2.9 G/DL (ref 3.4–5)
ALBUMIN/GLOB SERPL: 1 (ref 0.8–1.7)
ALP SERPL-CCNC: 103 U/L (ref 45–117)
ALT SERPL-CCNC: 77 U/L (ref 16–61)
ANION GAP SERPL CALC-SCNC: 6 MMOL/L (ref 3–18)
ARTERIAL PATENCY WRIST A: POSITIVE
AST SERPL-CCNC: 73 U/L (ref 10–38)
BASE EXCESS BLD CALC-SCNC: 2.8 MMOL/L
BASOPHILS # BLD: 0 K/UL (ref 0–0.1)
BASOPHILS NFR BLD: 0 % (ref 0–2)
BDY SITE: ABNORMAL
BILIRUB SERPL-MCNC: 0.7 MG/DL (ref 0.2–1)
BUN SERPL-MCNC: 28 MG/DL (ref 7–18)
BUN/CREAT SERPL: 16 (ref 12–20)
CALCIUM SERPL-MCNC: 10.5 MG/DL (ref 8.5–10.1)
CHLORIDE SERPL-SCNC: 93 MMOL/L (ref 100–111)
CO2 SERPL-SCNC: 31 MMOL/L (ref 21–32)
CREAT SERPL-MCNC: 1.7 MG/DL (ref 0.6–1.3)
DIFFERENTIAL METHOD BLD: ABNORMAL
EOSINOPHIL # BLD: 0.2 K/UL (ref 0–0.4)
EOSINOPHIL NFR BLD: 3 % (ref 0–5)
ERYTHROCYTE [DISTWIDTH] IN BLOOD BY AUTOMATED COUNT: 14.5 % (ref 11.6–14.5)
GAS FLOW.O2 O2 DELIVERY SYS: ABNORMAL
GLOBULIN SER CALC-MCNC: 3 G/DL (ref 2–4)
GLUCOSE SERPL-MCNC: 161 MG/DL (ref 74–99)
HCO3 BLD-SCNC: 27.8 MMOL/L (ref 22–26)
HCT VFR BLD AUTO: 40.4 % (ref 36–48)
HGB BLD-MCNC: 14.1 G/DL (ref 13–16)
IMM GRANULOCYTES # BLD AUTO: 0 K/UL (ref 0–0.04)
IMM GRANULOCYTES NFR BLD AUTO: 1 % (ref 0–0.5)
LACTATE BLD-SCNC: 0.69 MMOL/L (ref 0.4–2)
LYMPHOCYTES # BLD: 0.3 K/UL (ref 0.9–3.6)
LYMPHOCYTES NFR BLD: 4 % (ref 21–52)
MCH RBC QN AUTO: 26.9 PG (ref 24–34)
MCHC RBC AUTO-ENTMCNC: 34.9 G/DL (ref 31–37)
MCV RBC AUTO: 77.1 FL (ref 78–100)
MONOCYTES # BLD: 1.3 K/UL (ref 0.05–1.2)
MONOCYTES NFR BLD: 20 % (ref 3–10)
NEUTS SEG # BLD: 4.9 K/UL (ref 1.8–8)
NEUTS SEG NFR BLD: 73 % (ref 40–73)
NRBC # BLD: 0 K/UL (ref 0–0.01)
NRBC BLD-RTO: 0 PER 100 WBC
O2/TOTAL GAS SETTING VFR VENT: 21 %
PCO2 BLD: 43 MMHG (ref 35–45)
PH BLD: 7.42 (ref 7.35–7.45)
PLATELET # BLD AUTO: 290 K/UL (ref 135–420)
PMV BLD AUTO: 10.3 FL (ref 9.2–11.8)
PO2 BLD: 70 MMHG (ref 80–100)
POTASSIUM SERPL-SCNC: 3.3 MMOL/L (ref 3.5–5.5)
PROT SERPL-MCNC: 5.9 G/DL (ref 6.4–8.2)
RBC # BLD AUTO: 5.24 M/UL (ref 4.35–5.65)
RESPIRATORY RATE, POC: 14 (ref 5–40)
SAO2 % BLD: 94 % (ref 92–97)
SERVICE CMNT-IMP: ABNORMAL
SODIUM SERPL-SCNC: 130 MMOL/L (ref 136–145)
SPECIMEN TYPE: ABNORMAL
TROPONIN I SERPL HS-MCNC: 11 NG/L (ref 0–78)
WBC # BLD AUTO: 6.7 K/UL (ref 4.6–13.2)

## 2024-06-03 PROCEDURE — 83605 ASSAY OF LACTIC ACID: CPT

## 2024-06-03 PROCEDURE — 84484 ASSAY OF TROPONIN QUANT: CPT

## 2024-06-03 PROCEDURE — 96374 THER/PROPH/DIAG INJ IV PUSH: CPT

## 2024-06-03 PROCEDURE — 80053 COMPREHEN METABOLIC PANEL: CPT

## 2024-06-03 PROCEDURE — 87040 BLOOD CULTURE FOR BACTERIA: CPT

## 2024-06-03 PROCEDURE — 2580000003 HC RX 258: Performed by: PHYSICIAN ASSISTANT

## 2024-06-03 PROCEDURE — 85025 COMPLETE CBC W/AUTO DIFF WBC: CPT

## 2024-06-03 PROCEDURE — 36600 WITHDRAWAL OF ARTERIAL BLOOD: CPT

## 2024-06-03 PROCEDURE — 6360000002 HC RX W HCPCS: Performed by: PHYSICIAN ASSISTANT

## 2024-06-03 PROCEDURE — 71045 X-RAY EXAM CHEST 1 VIEW: CPT

## 2024-06-03 PROCEDURE — 84145 PROCALCITONIN (PCT): CPT

## 2024-06-03 PROCEDURE — 99285 EMERGENCY DEPT VISIT HI MDM: CPT

## 2024-06-03 PROCEDURE — 93005 ELECTROCARDIOGRAM TRACING: CPT | Performed by: INTERNAL MEDICINE

## 2024-06-03 PROCEDURE — 82803 BLOOD GASES ANY COMBINATION: CPT

## 2024-06-03 RX ADMIN — WATER 2000 MG: 1 INJECTION INTRAMUSCULAR; INTRAVENOUS; SUBCUTANEOUS at 23:54

## 2024-06-03 ASSESSMENT — ENCOUNTER SYMPTOMS
RHINORRHEA: 0
VOMITING: 0
ABDOMINAL PAIN: 0
COLOR CHANGE: 0
DIARRHEA: 0
SHORTNESS OF BREATH: 0

## 2024-06-04 ENCOUNTER — APPOINTMENT (OUTPATIENT)
Facility: HOSPITAL | Age: 84
DRG: 071 | End: 2024-06-04
Payer: MEDICARE

## 2024-06-04 PROBLEM — G93.40 ACUTE ENCEPHALOPATHY: Status: ACTIVE | Noted: 2024-06-04

## 2024-06-04 PROBLEM — N17.9 AKI (ACUTE KIDNEY INJURY) (HCC): Status: ACTIVE | Noted: 2024-06-04

## 2024-06-04 LAB
AMPHET UR QL SCN: NEGATIVE
ANION GAP SERPL CALC-SCNC: 5 MMOL/L (ref 3–18)
APPEARANCE UR: CLEAR
BACTERIA URNS QL MICRO: NEGATIVE /HPF
BARBITURATES UR QL SCN: NEGATIVE
BENZODIAZ UR QL: NEGATIVE
BILIRUB UR QL: NEGATIVE
BUN SERPL-MCNC: 24 MG/DL (ref 7–18)
BUN/CREAT SERPL: 20 (ref 12–20)
CALCIUM SERPL-MCNC: 10 MG/DL (ref 8.5–10.1)
CANNABINOIDS UR QL SCN: NEGATIVE
CHLORIDE SERPL-SCNC: 97 MMOL/L (ref 100–111)
CO2 SERPL-SCNC: 32 MMOL/L (ref 21–32)
COCAINE UR QL SCN: NEGATIVE
COLOR UR: ABNORMAL
CREAT SERPL-MCNC: 1.22 MG/DL (ref 0.6–1.3)
EKG ATRIAL RATE: 91 BPM
EKG DIAGNOSIS: NORMAL
EKG P AXIS: 50 DEGREES
EKG P-R INTERVAL: 164 MS
EKG Q-T INTERVAL: 370 MS
EKG QRS DURATION: 130 MS
EKG QTC CALCULATION (BAZETT): 455 MS
EKG R AXIS: -46 DEGREES
EKG T AXIS: 13 DEGREES
EKG VENTRICULAR RATE: 91 BPM
EPITH CASTS URNS QL MICRO: NEGATIVE /LPF (ref 0–5)
GLUCOSE BLD STRIP.AUTO-MCNC: 103 MG/DL (ref 70–110)
GLUCOSE BLD STRIP.AUTO-MCNC: 108 MG/DL (ref 70–110)
GLUCOSE BLD STRIP.AUTO-MCNC: 109 MG/DL (ref 70–110)
GLUCOSE BLD STRIP.AUTO-MCNC: 94 MG/DL (ref 70–110)
GLUCOSE SERPL-MCNC: 113 MG/DL (ref 74–99)
GLUCOSE UR STRIP.AUTO-MCNC: NEGATIVE MG/DL
HGB UR QL STRIP: NEGATIVE
KETONES UR QL STRIP.AUTO: NEGATIVE MG/DL
LEUKOCYTE ESTERASE UR QL STRIP.AUTO: NEGATIVE
Lab: NORMAL
METHADONE UR QL: NEGATIVE
NITRITE UR QL STRIP.AUTO: NEGATIVE
OPIATES UR QL: NEGATIVE
PCP UR QL: NEGATIVE
PH UR STRIP: 5.5 (ref 5–8)
POTASSIUM SERPL-SCNC: 3.3 MMOL/L (ref 3.5–5.5)
PROCALCITONIN SERPL-MCNC: 0.38 NG/ML
PROT UR STRIP-MCNC: 30 MG/DL
RBC #/AREA URNS HPF: NEGATIVE /HPF (ref 0–5)
SODIUM SERPL-SCNC: 134 MMOL/L (ref 136–145)
SP GR UR REFRACTOMETRY: >1.03 (ref 1–1.03)
UROBILINOGEN UR QL STRIP.AUTO: 0.2 EU/DL (ref 0.2–1)
WBC URNS QL MICRO: NEGATIVE /HPF (ref 0–4)

## 2024-06-04 PROCEDURE — 2580000003 HC RX 258: Performed by: PHYSICIAN ASSISTANT

## 2024-06-04 PROCEDURE — 1100000003 HC PRIVATE W/ TELEMETRY

## 2024-06-04 PROCEDURE — 80307 DRUG TEST PRSMV CHEM ANLYZR: CPT

## 2024-06-04 PROCEDURE — 70450 CT HEAD/BRAIN W/O DYE: CPT

## 2024-06-04 PROCEDURE — 71275 CT ANGIOGRAPHY CHEST: CPT

## 2024-06-04 PROCEDURE — 6370000000 HC RX 637 (ALT 250 FOR IP): Performed by: INTERNAL MEDICINE

## 2024-06-04 PROCEDURE — 6360000002 HC RX W HCPCS: Performed by: PHYSICIAN ASSISTANT

## 2024-06-04 PROCEDURE — 6360000002 HC RX W HCPCS: Performed by: INTERNAL MEDICINE

## 2024-06-04 PROCEDURE — 2580000003 HC RX 258: Performed by: INTERNAL MEDICINE

## 2024-06-04 PROCEDURE — 99223 1ST HOSP IP/OBS HIGH 75: CPT | Performed by: INTERNAL MEDICINE

## 2024-06-04 PROCEDURE — 74177 CT ABD & PELVIS W/CONTRAST: CPT

## 2024-06-04 PROCEDURE — 93010 ELECTROCARDIOGRAM REPORT: CPT | Performed by: INTERNAL MEDICINE

## 2024-06-04 PROCEDURE — 81001 URINALYSIS AUTO W/SCOPE: CPT

## 2024-06-04 PROCEDURE — 6360000004 HC RX CONTRAST MEDICATION: Performed by: PHYSICIAN ASSISTANT

## 2024-06-04 PROCEDURE — 80048 BASIC METABOLIC PNL TOTAL CA: CPT

## 2024-06-04 PROCEDURE — 82962 GLUCOSE BLOOD TEST: CPT

## 2024-06-04 RX ORDER — ASPIRIN 81 MG/1
81 TABLET ORAL DAILY
Status: DISCONTINUED | OUTPATIENT
Start: 2024-06-04 | End: 2024-06-05 | Stop reason: HOSPADM

## 2024-06-04 RX ORDER — PRAVASTATIN SODIUM 20 MG
10 TABLET ORAL NIGHTLY
Status: DISCONTINUED | OUTPATIENT
Start: 2024-06-04 | End: 2024-06-05 | Stop reason: HOSPADM

## 2024-06-04 RX ORDER — MULTIVIT WITH MINERALS/LUTEIN
500 TABLET ORAL DAILY
Status: DISCONTINUED | OUTPATIENT
Start: 2024-06-04 | End: 2024-06-05 | Stop reason: HOSPADM

## 2024-06-04 RX ORDER — SODIUM CHLORIDE 0.9 % (FLUSH) 0.9 %
5-40 SYRINGE (ML) INJECTION PRN
Status: DISCONTINUED | OUTPATIENT
Start: 2024-06-04 | End: 2024-06-05 | Stop reason: HOSPADM

## 2024-06-04 RX ORDER — SODIUM CHLORIDE 0.9 % (FLUSH) 0.9 %
5-40 SYRINGE (ML) INJECTION EVERY 12 HOURS SCHEDULED
Status: DISCONTINUED | OUTPATIENT
Start: 2024-06-04 | End: 2024-06-05 | Stop reason: HOSPADM

## 2024-06-04 RX ORDER — PANTOPRAZOLE SODIUM 40 MG/1
40 TABLET, DELAYED RELEASE ORAL
Status: DISCONTINUED | OUTPATIENT
Start: 2024-06-04 | End: 2024-06-05 | Stop reason: HOSPADM

## 2024-06-04 RX ORDER — ALBUTEROL SULFATE 90 UG/1
2 AEROSOL, METERED RESPIRATORY (INHALATION) EVERY 4 HOURS PRN
Status: DISCONTINUED | OUTPATIENT
Start: 2024-06-04 | End: 2024-06-04

## 2024-06-04 RX ORDER — TIMOLOL MALEATE 5 MG/ML
1 SOLUTION/ DROPS OPHTHALMIC 2 TIMES DAILY
Status: DISCONTINUED | OUTPATIENT
Start: 2024-06-04 | End: 2024-06-05 | Stop reason: HOSPADM

## 2024-06-04 RX ORDER — 0.9 % SODIUM CHLORIDE 0.9 %
1000 INTRAVENOUS SOLUTION INTRAVENOUS ONCE
Status: COMPLETED | OUTPATIENT
Start: 2024-06-04 | End: 2024-06-04

## 2024-06-04 RX ORDER — ACETAMINOPHEN 650 MG/1
650 SUPPOSITORY RECTAL EVERY 6 HOURS PRN
Status: DISCONTINUED | OUTPATIENT
Start: 2024-06-04 | End: 2024-06-05 | Stop reason: HOSPADM

## 2024-06-04 RX ORDER — TAMSULOSIN HYDROCHLORIDE 0.4 MG/1
0.4 CAPSULE ORAL DAILY
Status: DISCONTINUED | OUTPATIENT
Start: 2024-06-04 | End: 2024-06-05 | Stop reason: HOSPADM

## 2024-06-04 RX ORDER — MAGNESIUM SULFATE IN WATER 40 MG/ML
2000 INJECTION, SOLUTION INTRAVENOUS PRN
Status: DISCONTINUED | OUTPATIENT
Start: 2024-06-04 | End: 2024-06-05 | Stop reason: HOSPADM

## 2024-06-04 RX ORDER — ENOXAPARIN SODIUM 100 MG/ML
40 INJECTION SUBCUTANEOUS DAILY
Status: DISCONTINUED | OUTPATIENT
Start: 2024-06-05 | End: 2024-06-04

## 2024-06-04 RX ORDER — ACETAMINOPHEN 325 MG/1
650 TABLET ORAL EVERY 6 HOURS PRN
Status: DISCONTINUED | OUTPATIENT
Start: 2024-06-04 | End: 2024-06-05 | Stop reason: HOSPADM

## 2024-06-04 RX ORDER — POTASSIUM CHLORIDE 20 MEQ/1
40 TABLET, EXTENDED RELEASE ORAL PRN
Status: DISCONTINUED | OUTPATIENT
Start: 2024-06-04 | End: 2024-06-05 | Stop reason: HOSPADM

## 2024-06-04 RX ORDER — HEPARIN SODIUM 5000 [USP'U]/ML
5000 INJECTION, SOLUTION INTRAVENOUS; SUBCUTANEOUS EVERY 8 HOURS SCHEDULED
Status: DISCONTINUED | OUTPATIENT
Start: 2024-06-04 | End: 2024-06-05 | Stop reason: HOSPADM

## 2024-06-04 RX ORDER — ONDANSETRON 2 MG/ML
4 INJECTION INTRAMUSCULAR; INTRAVENOUS EVERY 6 HOURS PRN
Status: DISCONTINUED | OUTPATIENT
Start: 2024-06-04 | End: 2024-06-05 | Stop reason: HOSPADM

## 2024-06-04 RX ORDER — POLYETHYLENE GLYCOL 3350 17 G/17G
17 POWDER, FOR SOLUTION ORAL DAILY PRN
Status: DISCONTINUED | OUTPATIENT
Start: 2024-06-04 | End: 2024-06-05 | Stop reason: HOSPADM

## 2024-06-04 RX ORDER — POTASSIUM CHLORIDE 7.45 MG/ML
10 INJECTION INTRAVENOUS PRN
Status: DISCONTINUED | OUTPATIENT
Start: 2024-06-04 | End: 2024-06-05 | Stop reason: HOSPADM

## 2024-06-04 RX ORDER — ALBUTEROL SULFATE 2.5 MG/3ML
2.5 SOLUTION RESPIRATORY (INHALATION) EVERY 4 HOURS PRN
Status: DISCONTINUED | OUTPATIENT
Start: 2024-06-04 | End: 2024-06-05 | Stop reason: HOSPADM

## 2024-06-04 RX ORDER — SODIUM CHLORIDE AND POTASSIUM CHLORIDE 150; 900 MG/100ML; MG/100ML
INJECTION, SOLUTION INTRAVENOUS CONTINUOUS
Status: DISPENSED | OUTPATIENT
Start: 2024-06-04 | End: 2024-06-04

## 2024-06-04 RX ORDER — ONDANSETRON 4 MG/1
4 TABLET, ORALLY DISINTEGRATING ORAL EVERY 8 HOURS PRN
Status: DISCONTINUED | OUTPATIENT
Start: 2024-06-04 | End: 2024-06-05 | Stop reason: HOSPADM

## 2024-06-04 RX ORDER — IODIXANOL 320 MG/ML
100 INJECTION, SOLUTION INTRAVASCULAR
Status: COMPLETED | OUTPATIENT
Start: 2024-06-04 | End: 2024-06-04

## 2024-06-04 RX ORDER — LATANOPROST 50 UG/ML
1 SOLUTION/ DROPS OPHTHALMIC NIGHTLY
Status: DISCONTINUED | OUTPATIENT
Start: 2024-06-04 | End: 2024-06-05 | Stop reason: HOSPADM

## 2024-06-04 RX ORDER — SODIUM CHLORIDE 9 MG/ML
INJECTION, SOLUTION INTRAVENOUS PRN
Status: DISCONTINUED | OUTPATIENT
Start: 2024-06-04 | End: 2024-06-05 | Stop reason: HOSPADM

## 2024-06-04 RX ADMIN — LATANOPROST 1 DROP: 50 SOLUTION OPHTHALMIC at 21:46

## 2024-06-04 RX ADMIN — TIMOLOL MALEATE 1 DROP: 5 SOLUTION OPHTHALMIC at 21:41

## 2024-06-04 RX ADMIN — HEPARIN SODIUM 5000 UNITS: 5000 INJECTION INTRAVENOUS; SUBCUTANEOUS at 21:43

## 2024-06-04 RX ADMIN — TAMSULOSIN HYDROCHLORIDE 0.4 MG: 0.4 CAPSULE ORAL at 08:59

## 2024-06-04 RX ADMIN — IODIXANOL 100 ML: 320 INJECTION, SOLUTION INTRAVASCULAR at 00:55

## 2024-06-04 RX ADMIN — Medication 500 MG: at 08:59

## 2024-06-04 RX ADMIN — SODIUM CHLORIDE, PRESERVATIVE FREE 10 ML: 5 INJECTION INTRAVENOUS at 20:09

## 2024-06-04 RX ADMIN — SODIUM CHLORIDE, PRESERVATIVE FREE 10 ML: 5 INJECTION INTRAVENOUS at 09:01

## 2024-06-04 RX ADMIN — HEPARIN SODIUM 5000 UNITS: 5000 INJECTION INTRAVENOUS; SUBCUTANEOUS at 15:24

## 2024-06-04 RX ADMIN — SODIUM CHLORIDE 1000 ML: 9 INJECTION, SOLUTION INTRAVENOUS at 01:28

## 2024-06-04 RX ADMIN — PANTOPRAZOLE SODIUM 40 MG: 40 TABLET, DELAYED RELEASE ORAL at 08:59

## 2024-06-04 RX ADMIN — POTASSIUM CHLORIDE AND SODIUM CHLORIDE: 900; 150 INJECTION, SOLUTION INTRAVENOUS at 11:30

## 2024-06-04 RX ADMIN — CEFTRIAXONE 1000 MG: 1 INJECTION, POWDER, FOR SOLUTION INTRAMUSCULAR; INTRAVENOUS at 09:31

## 2024-06-04 RX ADMIN — ASPIRIN 81 MG: 81 TABLET, COATED ORAL at 09:00

## 2024-06-04 RX ADMIN — PRAVASTATIN SODIUM 10 MG: 20 TABLET ORAL at 20:08

## 2024-06-04 RX ADMIN — VANCOMYCIN HYDROCHLORIDE 2000 MG: 10 INJECTION, POWDER, LYOPHILIZED, FOR SOLUTION INTRAVENOUS at 03:49

## 2024-06-04 ASSESSMENT — LIFESTYLE VARIABLES
HOW MANY STANDARD DRINKS CONTAINING ALCOHOL DO YOU HAVE ON A TYPICAL DAY: PATIENT DOES NOT DRINK
HOW OFTEN DO YOU HAVE A DRINK CONTAINING ALCOHOL: NEVER

## 2024-06-04 ASSESSMENT — PAIN SCALES - GENERAL
PAINLEVEL_OUTOF10: 0

## 2024-06-04 ASSESSMENT — PAIN - FUNCTIONAL ASSESSMENT
PAIN_FUNCTIONAL_ASSESSMENT: 0-10
PAIN_FUNCTIONAL_ASSESSMENT: 0-10

## 2024-06-04 NOTE — H&P
History & Physical    Patient: Jesus Alberto Davis MRN: [id number]  CSN: [id number]    YOB: 1940  Age: 83 y.o.  Sex: male             DOA: 6/3/2024      Assessment/Plan:    1 Acute encephalopathy - unclear etiology; medication side effect/ polypharmacy (?) head ct unremarkable, non focal neuro exam,   HCC s/p chemo embolization march 2023 and may 30,2024- 4 days ago- cta negative for PE and ct abd pelvis negative for hemoperitoneum  Abg without respiratory acidosis;   Doubt subclinical seizures at this time  Afebrile  UA unremarkable  Non meningeal signs; non toxic appearing  Urine drug screen pending  Does have acute kidney injury  Mental status returning to baseline  Empiric abx for sepsis given in ER and blood cultures drawn; will order Rocephin for now; discontinue If remains afebrile/ bc negative    2 Acute kidney injury  No obstructive uropathy on ct abd pelvis  Likely pre renal  Bp borderline  Hold hctz hold valsartan; will also hold cardizem with borderline blood pressure  Nephrology consult if no improvement  Iv hydration    Hyponatremia sodium 130 Hypokalemia K 3.3- ordered iv normal saline 20 mEq KCl    3 Hepatocellular carcinoma s/p chemo-embolization, march 2023, may 30,2024 follows dr pollack and dr carlson  Ct abd pelvis with R hepatic lobe lesion    4 Dm type 2- hold metformin, use sliding scale    5 COPD- admission in hospital last month for copd exacerbation  Not wheezing currently no co2 narcosis  Prn Albuterol    6 Essential HTN- hold anti hypertensives as above    7 Glaucoma- continue eyedrops    8 GERD on ppi    Plan of care discussed with wife;         Principal Problem:    KAYLA (acute kidney injury) (HCC)  Resolved Problems:    * No resolved hospital problems. *                  Discussed with patient about plan of care  Full code  Heparin sc  Scd  NORM - 6/6/2024        HPI:    Chief Complaint:   Chief Complaint   Patient

## 2024-06-04 NOTE — CARE COORDINATION
06/04/24 1625   /Social Work Whiteboard Notes   /Social Work Whiteboard RED 06/04/2024 When medically ready patient dispo is home with family to transport. KIMBERLY Valiente BSW   Case Management

## 2024-06-04 NOTE — ED NOTES
Pt's wife at bedside. Pt remains sleeping with equal chest rise and fall.   Pt remains on oxygen via nasal cannula at 3 lpm. Wife reports pt hasn't been evaluated for sleep apnea, she does believe it has been suggested in the past.   She reports [facility] the pt was falling asleep then they were unable to wake the pt up.   She reports the pt often falls asleep in mid conversation.   Pt normally is able to ambulate very short distances with a cane.

## 2024-06-04 NOTE — PROGRESS NOTES
Advance Care Planning   Healthcare Decision Maker:    Primary Decision Maker: Shana Davis - Spouse - [phone]    Click here to complete Healthcare Decision Makers including selection of the Healthcare Decision Maker Relationship (ie \"Primary\").     conducted an initial consultation and Spiritual Assessment for Jesus Alberto Davis, who is a 83 y.o.,male. Patient's Primary Language is: English.   According to the patient's EMR Congregation Affiliation is: Religious.     The reason the Patient came to the hospital is:   Patient Active Problem List    Diagnosis Date Noted    KAYLA (acute kidney injury) (HCC) 06/04/2024    Acute encephalopathy 06/04/2024    Osteoarthritis of right knee 03/03/2021    Age-related cataract of both eyes 05/23/2019    Tachycardia 05/23/2019    Iron deficiency anemia 05/23/2019    Diabetes mellitus type 2, uncomplicated (HCC) 07/18/2016    Hard of hearing 01/01/2016    Iron deficiency 12/10/2014    Esophagitis 07/30/2014    Colon polyp 07/30/2014    Pulmonary nodule 09/21/2012    COPD (chronic obstructive pulmonary disease) (HCC) 07/21/2010    Benign neoplasm of adrenal gland 07/21/2010    Hypokalemia 07/21/2010    Essential hypertension, benign 05/13/2009    Hyperlipidemia 05/13/2009        The  provided the following Interventions:  Initiated a relationship of care and support.   Explored issues of maryjane, belief, spirituality and Tenriism/ritual needs while hospitalized.  Listened empathically.  Provided chaplaincy education concerning Advance Medical Directive.  Provided information about Spiritual Care Services.  Offered prayer and assurance of continued prayers on patient's behalf.   Chart reviewed.    The following outcomes where achieved:  Patient shared limited information about both their medical narrative and spiritual journey/beliefs.   confirmed Patient's Congregation Affiliation.  Patient processed feeling about current hospitalization.  Patient expressed  gratitude for 's visit.    Assessment:  Patient does not have any Sikhism/cultural needs that will affect patient's preferences in health care.  There are no spiritual or Sikhism issues which require intervention at this time.     Plan:  Chaplains will continue to follow and will provide pastoral care on an as needed/requested basis.   recommends bedside caregivers page  on duty if patient shows signs of acute spiritual or emotional distress.    Patient doing well. Wife at the bedside. Alert and calm. Wife was ask to bring copy of advance directive.      [place]   Staff   Spiritual Health  [phone]

## 2024-06-04 NOTE — ED TRIAGE NOTES
Per EMS pt was unresponsive on their arrival. During transport pt became responsive able to speak, alert oriented x 4.   Per family they are unaware if pt takes any night time medications that cause drowsiness.   Pt has hx of diabetes. 173 fsbs in route.

## 2024-06-04 NOTE — ED NOTES
TRANSFER - OUT REPORT:    Verbal report given to Dereck REYES RN  on Jesus Alberto Davis  being transferred to [facility] for routine progression of patient care       Report consisted of patient's Situation, Background, Assessment and   Recommendations(SBAR).     Information from the following report(s) ED Encounter Summary, ED SBAR, MAR, and Cardiac Rhythm Sinus  was reviewed with the receiving nurse.    [place] Fall Assessment:    Presents to emergency department  because of falls (Syncope, seizure, or loss of consciousness): Yes  Age > 70: Yes  Altered Mental Status, Intoxication with alcohol or substance confusion (Disorientation, impaired judgment, poor safety awaremess, or inability to follow instructions): No  Impaired Mobility: Ambulates or transfers with assistive devices or assistance; Unable to ambulate or transer.: Yes  Nursing Judgement: Yes          Lines:   Peripheral IV 06/03/24 Distal;Left;Anterior Cephalic (Active)   Site Assessment Clean, dry & intact 06/03/24 2339   Line Status Blood return noted 06/03/24 2339   Phlebitis Assessment No symptoms 06/03/24 2339   Infiltration Assessment 0 06/03/24 2339   Dressing Status New dressing applied 06/03/24 2339   Dressing Type Transparent 06/03/24 2339   Dressing Intervention New 06/03/24 2339       Peripheral IV 06/03/24 Right;Dorsal Hand (Active)   Site Assessment Clean, dry & intact 06/03/24 2342   Line Status Blood return noted 06/03/24 2342   Phlebitis Assessment No symptoms 06/03/24 2342   Infiltration Assessment 0 06/03/24 2342   Alcohol Cap Used No 06/03/24 2342   Dressing Status New dressing applied 06/03/24 2342   Dressing Type Transparent 06/03/24 2342   Dressing Intervention New 06/03/24 2342        Opportunity for questions and clarification was provided.      Patient transported with:  Monitor and O2 @ 3lpm RN

## 2024-06-04 NOTE — ED PROVIDER NOTES
EMERGENCY DEPARTMENT HISTORY AND PHYSICAL EXAM      Date: 6/3/2024  Patient Name: Jesus Alberto Davis    History of Presenting Illness     Chief Complaint   Patient presents with    Loss of Consciousness       History (Context): Jesus Alberto Davis is a 83 y.o. male with significant past medical history of anxiety, renal nodule, BPH, glaucoma, COPD, DM, asthma, hypertension, hepatocellular carcinoma presents via ambulance to the ED today. Per wife patient ate dinner per usual and began to nod off. She states when she attempted to arouse him he was not arousable so she called the ambulance. Per EMS patient was unresponsive when they arrived but became arousable while getting him into the ambulance. Patient reports he remembers this episode. No head trauma or injury. Patient has no complaints at this time.  Wife states patient has been more drowsy today.  No vomiting, diarrhea, cough, congestion. Patient currently under went chemoembolization on 5/30 for hepatocellular carcinoma. Wife states he has been taking his medications as prescribed.         PCP: Edson Garcia Jr., MD    Current Facility-Administered Medications   Medication Dose Route Frequency Provider Last Rate Last Admin    vancomycin (VANCOCIN) 2000 mg in 0.9% sodium chloride 500 mL IVPB  2,000 mg IntraVENous Once Martita Zeng PA         Current Outpatient Medications   Medication Sig Dispense Refill    fluticasone-salmeterol (WIXELA INHUB) 250-50 MCG/ACT AEPB diskus inhaler Inhale 1 puff into the lungs in the morning and 1 puff in the evening. 60 each 5    valsartan (DIOVAN) 320 MG tablet       timolol (TIMOPTIC-XE) 0.5 % ophthalmic gel-forming       tamsulosin (FLOMAX) 0.4 MG capsule take 1 capsule by mouth once daily after dinner 90 capsule 3    albuterol sulfate HFA (PROVENTIL;VENTOLIN;PROAIR) 108 (90 Base) MCG/ACT inhaler Inhale 2 puffs into the lungs every 4 hours as needed for Wheezing or Shortness of Breath 18 g 5    trihexyphenidyl

## 2024-06-04 NOTE — PLAN OF CARE
Problem: Discharge Planning  Goal: Discharge to home or other facility with appropriate resources  Outcome: Progressing  Flowsheets (Taken 6/4/2024 4268)  Discharge to home or other facility with appropriate resources:   Identify barriers to discharge with patient and caregiver   Identify discharge learning needs (meds, wound care, etc)   Arrange for needed discharge resources and transportation as appropriate   Arrange for interpreters to assist at discharge as needed     Problem: Pain  Goal: Verbalizes/displays adequate comfort level or baseline comfort level  Outcome: Progressing     Problem: Safety - Adult  Goal: Free from fall injury  Outcome: Progressing

## 2024-06-04 NOTE — ED NOTES
Pt has 02 saturation of 92% on room air while sleeping.  Pt placed on oxygen via nasal cannula at 3 lpm. Pt's 02 saturation increased to 96%.

## 2024-06-04 NOTE — PROGRESS NOTES
4 Eyes Skin Assessment     NAME:  Jesus Alberto Davis  YOB: 1940  MEDICAL RECORD NUMBER:  [id number]    The patient is being assessed for  Shift Handoff    I agree that at least one RN has performed a thorough Head to Toe Skin Assessment on the patient. ALL assessment sites listed below have been assessed.      Areas assessed by both nurses:    Arms, Elbows, Hands, Sacrum. Buttock, Coccyx, Ischium, and Legs. Feet and Heels        Does the Patient have a Wound? No noted wound(s)       [place] Prevention initiated by RN: Yes  Wound Care Orders initiated by RN: No    Pressure Injury (Stage 3,4, Unstageable, DTI, NWPT, and Complex wounds) if present, place Wound referral order by RN under : No    New Ostomies, if present place, Ostomy referral order under : No     Nurse 1 eSignature: Electronically signed by Lars Garcia RN on 6/4/24 at 7:43 PM EDT    **SHARE this note so that the co-signing nurse can place an eSignature**    Nurse 2 eSignature: {Esignature:[id number]}    Statement Selected Statement Selected Statement Selected

## 2024-06-04 NOTE — PLAN OF CARE
0500 woke up and I attempted to reorient him.  He thought he was at work, He did know the year, not month .   0725 bedside turnover SBAR< MAR< ED summary given with a chance to ask questions.  At shift change he knows self and bday, not year or month, knows president                82 Y/o male full code 06/03/24 ate dinner and went to sleep, not responsive to wife, he was more sleepy all day than normal.   Allergies: penicillin, flu vaccination, lisinopril, rofecoxib  History: Had a chemoembolization (05/30/24) for hepatocellular carcinoma, anxiety, renal nodule, BPH, glaucoma, COPD, diabetes, asthma, HTN, hepatocellular carcinoma, diabetes  Diagnosis: tiny left cerebellar infarct, upper lobe pulmonary nodule, elevated liver enzymes, acute encephalopathy    Neuro: alert and oriented at the start of shift at 1900 until about 2300. At that time he became slightly confused, pulling and removing equipment.    Respiratory NC 3 lpm    Cardiac: NSR to Sinus tachycardia with Right bundle branch block, cardiac monitor 15  dvt prophylaxis heparin sub Q    GI regular diet     urinal and BM prior to arrival    Skin intact exception scars and bruising    Lines 20 gauge Left hand, 20 gauge Right hand, 20 gauge Left AC    Plan: discharge home post antibiotics   0000 moved pt closer to nurses station, continuously removing his cardiac monitor and tieing knots around the bed side rail. Slightly confused.  Problem: Discharge Planning  Goal: Discharge to home or other facility with appropriate resources  6/4/2024 1940 by Lars Garcia, RN  Outcome: Progressing  6/4/2024 1216 by Bessie Cabral, RN  Outcome: Progressing  Flowsheets (Taken 6/4/2024 0748)  Discharge to home or other facility with appropriate resources:   Identify barriers to discharge with patient and caregiver   Identify discharge learning needs (meds, wound care, etc)   Arrange for needed discharge resources and transportation as appropriate   Arrange for  interpreters to assist at discharge as needed     Problem: Pain  Goal: Verbalizes/displays adequate comfort level or baseline comfort level  6/4/2024 1940 by Lars Garcia RN  Outcome: Progressing  6/4/2024 1216 by Bessie Cabral RN  Outcome: Progressing     Problem: Safety - Adult  Goal: Free from fall injury  6/4/2024 1940 by Lars Garcia RN  Outcome: Progressing  6/4/2024 1216 by Bessie Cabral RN  Outcome: Progressing     Problem: ABCDS Injury Assessment  Goal: Absence of physical injury  Outcome: Progressing

## 2024-06-05 VITALS
HEIGHT: 69 IN | OXYGEN SATURATION: 97 % | HEART RATE: 110 BPM | TEMPERATURE: 99 F | BODY MASS INDEX: 29.62 KG/M2 | RESPIRATION RATE: 16 BRPM | SYSTOLIC BLOOD PRESSURE: 141 MMHG | DIASTOLIC BLOOD PRESSURE: 83 MMHG | WEIGHT: 200 LBS

## 2024-06-05 PROBLEM — G93.40 ACUTE ENCEPHALOPATHY: Status: RESOLVED | Noted: 2024-06-04 | Resolved: 2024-06-05

## 2024-06-05 PROBLEM — N17.9 AKI (ACUTE KIDNEY INJURY) (HCC): Status: RESOLVED | Noted: 2024-06-04 | Resolved: 2024-06-05

## 2024-06-05 LAB
ANION GAP SERPL CALC-SCNC: 6 MMOL/L (ref 3–18)
BASOPHILS # BLD: 0 K/UL (ref 0–0.1)
BASOPHILS NFR BLD: 1 % (ref 0–2)
BUN SERPL-MCNC: 15 MG/DL (ref 7–18)
BUN/CREAT SERPL: 20 (ref 12–20)
CALCIUM SERPL-MCNC: 9.7 MG/DL (ref 8.5–10.1)
CHLORIDE SERPL-SCNC: 102 MMOL/L (ref 100–111)
CO2 SERPL-SCNC: 28 MMOL/L (ref 21–32)
CREAT SERPL-MCNC: 0.74 MG/DL (ref 0.6–1.3)
DIFFERENTIAL METHOD BLD: ABNORMAL
EOSINOPHIL # BLD: 0.4 K/UL (ref 0–0.4)
EOSINOPHIL NFR BLD: 7 % (ref 0–5)
ERYTHROCYTE [DISTWIDTH] IN BLOOD BY AUTOMATED COUNT: 14.6 % (ref 11.6–14.5)
GLUCOSE BLD STRIP.AUTO-MCNC: 88 MG/DL (ref 70–110)
GLUCOSE SERPL-MCNC: 98 MG/DL (ref 74–99)
HCT VFR BLD AUTO: 37.4 % (ref 36–48)
HGB BLD-MCNC: 13.1 G/DL (ref 13–16)
IMM GRANULOCYTES # BLD AUTO: 0.1 K/UL (ref 0–0.04)
IMM GRANULOCYTES NFR BLD AUTO: 1 % (ref 0–0.5)
LYMPHOCYTES # BLD: 0.3 K/UL (ref 0.9–3.6)
LYMPHOCYTES NFR BLD: 6 % (ref 21–52)
MCH RBC QN AUTO: 27.6 PG (ref 24–34)
MCHC RBC AUTO-ENTMCNC: 35 G/DL (ref 31–37)
MCV RBC AUTO: 78.7 FL (ref 78–100)
MONOCYTES # BLD: 1.1 K/UL (ref 0.05–1.2)
MONOCYTES NFR BLD: 19 % (ref 3–10)
NEUTS SEG # BLD: 4.1 K/UL (ref 1.8–8)
NEUTS SEG NFR BLD: 67 % (ref 40–73)
NRBC # BLD: 0 K/UL (ref 0–0.01)
NRBC BLD-RTO: 0 PER 100 WBC
PLATELET # BLD AUTO: 297 K/UL (ref 135–420)
PMV BLD AUTO: 10.7 FL (ref 9.2–11.8)
POTASSIUM SERPL-SCNC: 3.5 MMOL/L (ref 3.5–5.5)
PROCALCITONIN SERPL-MCNC: 0.15 NG/ML
RBC # BLD AUTO: 4.75 M/UL (ref 4.35–5.65)
SODIUM SERPL-SCNC: 136 MMOL/L (ref 136–145)
WBC # BLD AUTO: 6.1 K/UL (ref 4.6–13.2)

## 2024-06-05 PROCEDURE — 6360000002 HC RX W HCPCS: Performed by: INTERNAL MEDICINE

## 2024-06-05 PROCEDURE — 94761 N-INVAS EAR/PLS OXIMETRY MLT: CPT

## 2024-06-05 PROCEDURE — 94640 AIRWAY INHALATION TREATMENT: CPT

## 2024-06-05 PROCEDURE — 36415 COLL VENOUS BLD VENIPUNCTURE: CPT

## 2024-06-05 PROCEDURE — 80048 BASIC METABOLIC PNL TOTAL CA: CPT

## 2024-06-05 PROCEDURE — 2580000003 HC RX 258: Performed by: INTERNAL MEDICINE

## 2024-06-05 PROCEDURE — 97162 PT EVAL MOD COMPLEX 30 MIN: CPT

## 2024-06-05 PROCEDURE — 99239 HOSP IP/OBS DSCHRG MGMT >30: CPT | Performed by: STUDENT IN AN ORGANIZED HEALTH CARE EDUCATION/TRAINING PROGRAM

## 2024-06-05 PROCEDURE — 97530 THERAPEUTIC ACTIVITIES: CPT

## 2024-06-05 PROCEDURE — 85025 COMPLETE CBC W/AUTO DIFF WBC: CPT

## 2024-06-05 PROCEDURE — 6370000000 HC RX 637 (ALT 250 FOR IP): Performed by: INTERNAL MEDICINE

## 2024-06-05 PROCEDURE — 84145 PROCALCITONIN (PCT): CPT

## 2024-06-05 PROCEDURE — 2700000000 HC OXYGEN THERAPY PER DAY

## 2024-06-05 PROCEDURE — 82962 GLUCOSE BLOOD TEST: CPT

## 2024-06-05 RX ADMIN — ASPIRIN 81 MG: 81 TABLET, COATED ORAL at 09:39

## 2024-06-05 RX ADMIN — CEFTRIAXONE 1000 MG: 1 INJECTION, POWDER, FOR SOLUTION INTRAMUSCULAR; INTRAVENOUS at 09:39

## 2024-06-05 RX ADMIN — ALBUTEROL SULFATE 2.5 MG: 2.5 SOLUTION RESPIRATORY (INHALATION) at 15:40

## 2024-06-05 RX ADMIN — TIMOLOL MALEATE 1 DROP: 5 SOLUTION OPHTHALMIC at 09:40

## 2024-06-05 RX ADMIN — PANTOPRAZOLE SODIUM 40 MG: 40 TABLET, DELAYED RELEASE ORAL at 09:39

## 2024-06-05 RX ADMIN — Medication 500 MG: at 09:38

## 2024-06-05 RX ADMIN — SODIUM CHLORIDE, PRESERVATIVE FREE 10 ML: 5 INJECTION INTRAVENOUS at 09:39

## 2024-06-05 RX ADMIN — HEPARIN SODIUM 5000 UNITS: 5000 INJECTION INTRAVENOUS; SUBCUTANEOUS at 06:22

## 2024-06-05 RX ADMIN — TAMSULOSIN HYDROCHLORIDE 0.4 MG: 0.4 CAPSULE ORAL at 09:39

## 2024-06-05 ASSESSMENT — PAIN SCALES - GENERAL
PAINLEVEL_OUTOF10: 0

## 2024-06-05 NOTE — PROGRESS NOTES
0740: Bedside and Verbal shift change report given to TIERRA Spears (oncoming nurse) by TIERRA Sousa (offgoing nurse). Report included the following information Nurse Handoff Report, Adult Overview, Intake/Output, MAR, Recent Results, Med Rec Status, and Cardiac Rhythm NSR .     1039: Family at bedside.     1315: MD stated to wean patient off oxygen prior to discharge. O2 sats goals is 88-92%    1344: Weaned patient down to 2 L NC. O2 sats: 97%    1437: Patient was on RA. O2 sats 79%. Placed patient back on 2 L NC. Patient recovered back to 93%.     1540: Breathing tx provided by RT. MD requested prior to weaning patient oxygen.     1605: Weaned patient to RA. O2 saturations 92%.    1637: O2 saturations 92% on RA.     1646: MD made aware of patient being 92% on RA. O2 saturations currently 96%. Patient still cleared for discharge. AVS printed.     1715: Discharge instructions and medications reviewed with the patient and spouse and appropriate educational materials and side effects teaching were provided. Removed PIV. Patient tolerated well. Allowed time for questions. All questions answered.     1739: Patient transported down to [facility] entrance via transport.

## 2024-06-05 NOTE — PLAN OF CARE
Problem: Discharge Planning  Goal: Discharge to home or other facility with appropriate resources  6/5/2024 1650 by Tory Givens RN  Outcome: Adequate for Discharge  6/5/2024 1042 by Tory Givens RN  Outcome: Progressing     Problem: Pain  Goal: Verbalizes/displays adequate comfort level or baseline comfort level  6/5/2024 1650 by Tory Givens RN  Outcome: Adequate for Discharge  6/5/2024 1042 by Tory Givens RN  Outcome: Progressing  Flowsheets (Taken 6/4/2024 2345 by Lars Garcia, RN)  Verbalizes/displays adequate comfort level or baseline comfort level:   Encourage patient to monitor pain and request assistance   Assess pain using appropriate pain scale   Administer analgesics based on type and severity of pain and evaluate response   Implement non-pharmacological measures as appropriate and evaluate response     Problem: Safety - Adult  Goal: Free from fall injury  6/5/2024 1650 by Tory Givens RN  Outcome: Adequate for Discharge  6/5/2024 1042 by Tory Givens RN  Outcome: Progressing     Problem: ABCDS Injury Assessment  Goal: Absence of physical injury  6/5/2024 1650 by Tory Givens RN  Outcome: Adequate for Discharge  6/5/2024 1042 by Tory Givens RN  Outcome: Progressing     Problem: Skin/Tissue Integrity  Goal: Absence of new skin breakdown  Description: 1.  Monitor for areas of redness and/or skin breakdown  2.  Assess vascular access sites hourly  3.  Every 4-6 hours minimum:  Change oxygen saturation probe site  4.  Every 4-6 hours:  If on nasal continuous positive airway pressure, respiratory therapy assess nares and determine need for appliance change or resting period.  6/5/2024 1650 by Tory Givens RN  Outcome: Adequate for Discharge  6/5/2024 1042 by Tory Givens RN  Outcome: Progressing

## 2024-06-05 NOTE — DISCHARGE SUMMARY
atelectasis or fibrotic changes. The cardiac  silhouette is not enlarged. No pleural effusions are noted.    Impression  No acute pulmonary infiltrates are demonstrated.    CAT Scan Result (most recent):  CT ABDOMEN PELVIS W IV CONTRAST 06/04/2024    Narrative  EXAMINATION:    CTA chest pulmonary  CT abdomen/pelvis with contrast    INDICATION: Syncope    COMPARISON: CT chest 8/6/2021    TECHNIQUE: CT angiography of the pulmonary arteries with IV contrast and 3D MIP  reformations. Additional standard CT of the abdomen pelvis with IV contrast. All  CT scans at this facility are performed using dose optimization technique as  appropriate to a performed exam, to include automated exposure control,  adjustment of the mA and/or kV according to patient size (including appropriate  matching first site specific examinations), or use of iterative reconstruction  technique.    FINDINGS:    CTA CHEST:    Pulmonary arteries: No pulmonary artery filling defects identified to suggest  pulmonary embolus. No evidence of right heart strain.    Cardiovascular: Thoracic aorta normal in course and caliber. Notable left  coronary calcifications. Aortic valve calcifications. Heart size overall normal.    Mediastinum: Imaged thyroid unremarkable. Borderline prominent right  paratracheal nodes.    Pleura: Small right pleural effusion. No evidence of pneumothorax.    Lungs/Airways: Multi lobar mild bronchial wall thickening, and lower lobe distal  likely endobronchial infiltrate/debris. Azygos fissure. Right upper lobe 4 mm  nodule on image 181 left upper lobe subpleural 1.1 x 0.5 cm nodule similar to  prior. Right greater than left lung base streaky and groundglass likely  atelectasis.    Miscellaneous: Superficial soft tissues unremarkable.    Bones: No acute osseous findings.    ABDOMEN/PELVIS:    Hepatobiliary: Heterogeneous hypodense masslike structure in the posterior right  hepatic lobe measuring roughly 7.6 x 5.1 cm axially. Multiple  extended release tablet  Commonly known as: TOPROL XL     omeprazole 20 MG EC tablet     polyethyl glycol-propyl glycol 0.4-0.3 % 0.4-0.3 % ophthalmic solution  Commonly known as: SYSTANE     potassium chloride 20 MEQ extended release tablet  Commonly known as: KLOR-CON M     prednisoLONE acetate 1 % ophthalmic suspension  Commonly known as: PRED FORTE     senna-docusate 8.6-50 MG per tablet  Commonly known as: PERICOLACE     sildenafil 100 MG tablet  Commonly known as: VIAGRA     spironolactone 25 MG tablet  Commonly known as: ALDACTONE     tamsulosin 0.4 MG capsule  Commonly known as: FLOMAX     valsartan 320 MG tablet  Commonly known as: DIOVAN              Activity: activity as tolerated    Functional status and cognitive function:    Ambulates without assistance   Status: alert, appears stated age, and cooperative    Diet: regular diet    Wound Care: none needed        Follow-up: with PCP, Edson Garcia Jr., MD in 7-10days      Minutes spent on discharge: >30 minutes spent coordinating this discharge (review instructions/follow-up, prescriptions, preparing report for sign off)

## 2024-06-05 NOTE — CARE COORDINATION
06/05/24 1552   /Social Work Whiteboard Notes   /Social Work Whiteboard GREEN 06/05/2024 Patient is discharged and dispo is home with spouse, spouse at bedside to transport. KIMBERLY Valiente BSW   Case Management

## 2024-06-05 NOTE — PROGRESS NOTES
0740: Bedside and Verbal shift change report given to TIERRA Vaz (oncoming nurse) by TIERRA Cobian (offgoing nurse). Report included the following information Nurse Handoff Report, Adult Overview, Intake/Output, MAR, Recent Results, and Cardiac Rhythm NSR .     1621: MD notified of one beat of ST elevation    1623: MD notified nurse that there were no new orders.     1658: MD notified of clients tachycardic state. No new orders at this time.     1936: Bedside and Verbal shift change report given to TIERRA Chan (oncoming nurse) by TIERRA Vaz (offgoing nurse). Report included the following information Nurse Handoff Report, Adult Overview, Intake/Output, MAR, Recent Results, and Cardiac Rhythm NSR.

## 2024-06-05 NOTE — PLAN OF CARE
Problem: Discharge Planning  Goal: Discharge to home or other facility with appropriate resources  Outcome: Progressing     Problem: Pain  Goal: Verbalizes/displays adequate comfort level or baseline comfort level  Outcome: Progressing  Flowsheets (Taken 6/4/2024 2345 by Lars Garcia RN)  Verbalizes/displays adequate comfort level or baseline comfort level:   Encourage patient to monitor pain and request assistance   Assess pain using appropriate pain scale   Administer analgesics based on type and severity of pain and evaluate response   Implement non-pharmacological measures as appropriate and evaluate response     Problem: Safety - Adult  Goal: Free from fall injury  Outcome: Progressing     Problem: ABCDS Injury Assessment  Goal: Absence of physical injury  Outcome: Progressing     Problem: Skin/Tissue Integrity  Goal: Absence of new skin breakdown  Description: 1.  Monitor for areas of redness and/or skin breakdown  2.  Assess vascular access sites hourly  3.  Every 4-6 hours minimum:  Change oxygen saturation probe site  4.  Every 4-6 hours:  If on nasal continuous positive airway pressure, respiratory therapy assess nares and determine need for appliance change or resting period.  Outcome: Progressing

## 2024-06-05 NOTE — CARE COORDINATION
06/05/24 1030   Discharge Planning   Living Arrangements Spouse/Significant Other   Support Systems Spouse/Significant Other;Children   Current DME Prior to Arrival Walker;Shower Chair   Potential Assistance Needed N/A   Potential Assistance Purchasing Medications No   M2B Y/N Not Assessed   Potential DME Needed Other (Comment)  (Pending PT/OT recs)   Type of Home Care Services None   Patient expects to be discharged to: House       SW presented to bedside, introduced self and explained role. Pt presented as A&Ox4 and agreeable to visit. Pt confirmed all of the demographics listed on file. Per the pt he resides with his wife Shana Davis [phone]. Per th ept they reside in a 1 level home with 4 steps to its entrance. Per the pt, he was independent with ADLs prior to admission and ambulated with a RW. Pt stated he also has a shower chair.    Pt reported that his dtr or wife will provide him with transportation home at the time of discharge. Per the pt, he is not amenable to HH if recommended. Pt confirmed PCP as Dr. Garcia.    Pt denies any questions or concerns for this writer. SW/CM will continue to follow.    Bozena Capps, MSW  Case Management Department

## 2024-06-05 NOTE — CARE COORDINATION
Discharge order noted for today. Orders received. No other CM needs identified at this time. Case management remains available as needed.    Zully Valiente BSW   Case Management

## 2024-06-09 LAB
BACTERIA SPEC CULT: NORMAL
BACTERIA SPEC CULT: NORMAL
SERVICE CMNT-IMP: NORMAL
SERVICE CMNT-IMP: NORMAL

## 2024-06-12 NOTE — PROGRESS NOTES
Physician Progress Note      PATIENT:               SEYMOUR LOOB  CSN #:                  [id number]  :                       1940  ADMIT DATE:       6/3/2024 10:30 PM  DISCH DATE:        2024 5:39 PM  RESPONDING  PROVIDER #:        Lm Goyal MD          QUERY TEXT:    Good morning.    Pt admitted with acute encephalopathy. Pt noted to have KAYLA. Consideration was   also taken for secondary drug effect.    If possible, please further specify the etiology of the encephalopathy:    The medical record reflects the following:    Risk Factors: 83 yr old male, hepatocellular carcinoma, DM II, COPD, HTN,   GERD, Glaucoma,    Clinical Indicators: from  DC summary: \"Consideration was taken for a   mild KAYLA that patient had a perhaps secondary to drug effect.  Patient was   admitted and subsequently had significant improvement in in mental state.    Subsequent laboratory examinations showed resolution of his KAYLA and given   patient had complete resolution of his altered mental state, patient was   discharged home in good state\"; from  H&P: \"Acute encephalopathy -   unclear etiology; medication side effect/ polypharmacy (?)\";  Head CT: No   clearly acute intracranial findings. Evidence of tiny remote left cerebellar   infarct and likely remote right thalamic lacunar infarct. -Moderate to   extensive burden of periventricular and beyond likely chronic  microvascular ischemic change\";  Chest CTA: \"CTA Chest: -No evidence of   pulmonary embolus or right heart strain.  -4 mm right upper lobe pulmonary nodule is new since prior. Small right   pleural effusion also new since prior. Recommend attention on follow-up   imaging. Left upper lobe subpleural nodule similar to prior. -Likely mild   bronchitis/reactive airways with minimal lower lobe basilar endobronchial   infiltrate/mucus. -Left coronary atherosclerosis. Aortic valve   calcifications\";  CT abd/pelvis: -Heterogeneous

## (undated) DEVICE — FLUFF AND POLYMER UNDERPAD,EXTRA HEAVY: Brand: WINGS

## (undated) DEVICE — CANNULA ORIG TL CLR W FOAM CUSHIONS AND 14FT SUPL TB 3 CHN

## (undated) DEVICE — FORCEPS BX L240CM JAW DIA2.8MM L CAP W/ NDL MIC MESH TOOTH

## (undated) DEVICE — SYR 50ML SLIP TIP NSAF LF STRL --

## (undated) DEVICE — FLEX ADVANTAGE 3000CC: Brand: FLEX ADVANTAGE

## (undated) DEVICE — MEDI-VAC SUCTION HIGH CAPACITY: Brand: CARDINAL HEALTH

## (undated) DEVICE — AIRLIFE™ NASAL OXYGEN CANNULA CURVED, FLARED TIP WITH 14 FOOT (4.3 M) CRUSH-RESISTANT TUBING, OVER-THE-EAR STYLE: Brand: AIRLIFE™

## (undated) DEVICE — CATHETER SUCT TR FL TIP 14FR W/ O CTRL

## (undated) DEVICE — BASIN EMESIS 500CC ROSE 250/CS 60/PLT: Brand: MEDEGEN MEDICAL PRODUCTS, LLC

## (undated) DEVICE — AIRLIFE™ NASAL OXYGEN CANNULA CURVED, NONFLARED TIP WITH 14 FOOT (4.3 M) CRUSH-RESISTANT TUBING, OVER-THE-EAR STYLE: Brand: AIRLIFE™

## (undated) DEVICE — STERILE POLYISOPRENE POWDER-FREE SURGICAL GLOVES: Brand: PROTEXIS

## (undated) DEVICE — GOWN ISOL IMPERV UNIV, DISP, OPEN BACK, BLUE --

## (undated) DEVICE — ENDOSCOPY PUMP TUBING/ CAP SET: Brand: ERBE

## (undated) DEVICE — FCPS RAD JAW 4LC 240CM W/NDL -- BX/20 RADIAL JAW 4

## (undated) DEVICE — SYRINGE MED 25GA 3ML L5/8IN SUBQ PLAS W/ DETACH NDL SFTY

## (undated) DEVICE — GAUZE,SPONGE,4"X4",16PLY,STRL,LF,10/TRAY: Brand: MEDLINE

## (undated) DEVICE — SYR 20ML LL STRL LF --

## (undated) DEVICE — BITE BLOCK ENDOSCP UNIV AD 6 TO 9.4 MM

## (undated) DEVICE — MEDI-VAC NON-CONDUCTIVE SUCTION TUBING: Brand: CARDINAL HEALTH

## (undated) DEVICE — SOLUTION IRRIG 1000ML H2O STRL BLT

## (undated) DEVICE — SYR 10ML LUER LOK 1/5ML GRAD --